# Patient Record
Sex: MALE | Race: WHITE | NOT HISPANIC OR LATINO
[De-identification: names, ages, dates, MRNs, and addresses within clinical notes are randomized per-mention and may not be internally consistent; named-entity substitution may affect disease eponyms.]

---

## 2019-01-07 PROBLEM — Z00.00 ENCOUNTER FOR PREVENTIVE HEALTH EXAMINATION: Status: ACTIVE | Noted: 2019-01-07

## 2019-02-26 ENCOUNTER — RECORD ABSTRACTING (OUTPATIENT)
Age: 69
End: 2019-02-26

## 2019-02-26 ENCOUNTER — RX RENEWAL (OUTPATIENT)
Age: 69
End: 2019-02-26

## 2019-02-26 DIAGNOSIS — E78.5 HYPERLIPIDEMIA, UNSPECIFIED: ICD-10-CM

## 2019-02-26 DIAGNOSIS — Z78.9 OTHER SPECIFIED HEALTH STATUS: ICD-10-CM

## 2019-03-04 ENCOUNTER — RESULT REVIEW (OUTPATIENT)
Age: 69
End: 2019-03-04

## 2019-03-22 ENCOUNTER — LABORATORY RESULT (OUTPATIENT)
Age: 69
End: 2019-03-22

## 2019-03-22 ENCOUNTER — APPOINTMENT (OUTPATIENT)
Dept: ENDOCRINOLOGY | Facility: CLINIC | Age: 69
End: 2019-03-22
Payer: MEDICARE

## 2019-03-22 VITALS
BODY MASS INDEX: 27.04 KG/M2 | WEIGHT: 204 LBS | DIASTOLIC BLOOD PRESSURE: 78 MMHG | HEIGHT: 73 IN | SYSTOLIC BLOOD PRESSURE: 132 MMHG | HEART RATE: 68 BPM

## 2019-03-22 DIAGNOSIS — D64.9 ANEMIA, UNSPECIFIED: ICD-10-CM

## 2019-03-22 PROCEDURE — 36415 COLL VENOUS BLD VENIPUNCTURE: CPT

## 2019-03-22 PROCEDURE — 99213 OFFICE O/P EST LOW 20 MIN: CPT | Mod: 25

## 2019-03-23 LAB
25(OH)D3 SERPL-MCNC: 49.9 NG/ML
ALBUMIN SERPL ELPH-MCNC: 4.1 G/DL
ALP BLD-CCNC: 72 U/L
ALT SERPL-CCNC: 22 U/L
AST SERPL-CCNC: 20 U/L
BASOPHILS # BLD AUTO: 0.04 K/UL
BASOPHILS NFR BLD AUTO: 0.5 %
BILIRUB DIRECT SERPL-MCNC: 0.1 MG/DL
BILIRUB INDIRECT SERPL-MCNC: 0.4 MG/DL
BILIRUB SERPL-MCNC: 0.5 MG/DL
CHOLEST SERPL-MCNC: 112 MG/DL
CHOLEST/HDLC SERPL: 2.8 RATIO
EOSINOPHIL # BLD AUTO: 0.19 K/UL
EOSINOPHIL NFR BLD AUTO: 2.3 %
ESTIMATED AVERAGE GLUCOSE: 97 MG/DL
HBA1C MFR BLD HPLC: 5 %
HCT VFR BLD CALC: 43.6 %
HDLC SERPL-MCNC: 40 MG/DL
HGB BLD-MCNC: 14.5 G/DL
IMM GRANULOCYTES NFR BLD AUTO: 0.4 %
LDLC SERPL CALC-MCNC: 48 MG/DL
LYMPHOCYTES # BLD AUTO: 2.47 K/UL
LYMPHOCYTES NFR BLD AUTO: 29.8 %
MAN DIFF?: NORMAL
MCHC RBC-ENTMCNC: 31.5 PG
MCHC RBC-ENTMCNC: 33.3 GM/DL
MCV RBC AUTO: 94.8 FL
MONOCYTES # BLD AUTO: 0.63 K/UL
MONOCYTES NFR BLD AUTO: 7.6 %
NEUTROPHILS # BLD AUTO: 4.94 K/UL
NEUTROPHILS NFR BLD AUTO: 59.4 %
PLATELET # BLD AUTO: 226 K/UL
PROT SERPL-MCNC: 6.6 G/DL
RBC # BLD: 4.6 M/UL
RBC # FLD: 12.5 %
T3 SERPL-MCNC: 100 NG/DL
T3RU NFR SERPL: 0.9 TBI
T4 SERPL-MCNC: 11.3 UG/DL
TRIGL SERPL-MCNC: 118 MG/DL
TSH SERPL-ACNC: 0.08 UIU/ML
VIT B12 SERPL-MCNC: 384 PG/ML
WBC # FLD AUTO: 8.3 K/UL

## 2019-03-23 NOTE — PHYSICAL EXAM
[Alert] : alert [No Acute Distress] : no acute distress [Well Nourished] : well nourished [Well Developed] : well developed [Healthy Appearance] : healthy appearance [Normal Voice/Communication] : normal voice communication [Normal Sclera/Conjunctiva] : normal sclera/conjunctiva [EOMI] : extra ocular movement intact [No Proptosis] : no proptosis [Normal Outer Ear/Nose] : the ears and nose were normal in appearance [Normal Oropharynx] : the oropharynx was normal [No Neck Mass] : no neck mass was observed [Thyroid Not Enlarged] : the thyroid was not enlarged [No Thyroid Nodules] : there were no palpable thyroid nodules [No Respiratory Distress] : no respiratory distress [Normal Rate] : heart rate was normal  [Normal S1, S2] : normal S1 and S2 [Regular Rhythm] : with a regular rhythm [Pedal Pulses Normal] : the pedal pulses are present [No Edema] : there was no peripheral edema [No Stigmata of Cushings Syndrome] : no stigmata of cushings syndrome [Normal Gait] : normal gait [No Tremors] : no tremors [Oriented x3] : oriented to person, place, and time [Normal Insight/Judgement] : insight and judgment were intact [Normal Affect] : the affect was normal [Normal Mood] : the mood was normal [Acanthosis Nigricans] : no acanthosis nigricans

## 2019-03-23 NOTE — ASSESSMENT
[FreeTextEntry1] : &\par Hypothyroid post surgery/treatment for thyroid cancer.\par Doing well.\par ROV 6 months

## 2019-03-23 NOTE — HISTORY OF PRESENT ILLNESS
[FreeTextEntry1] : March 22, 2019\par \par   PCP: Dr. Job Leon c/o [facility] p [phone]\par             f [phone]\par             (spec: Pulmonary, CCM, Sleep) ~1/2016\par             will see soon, ha flu shot\par            . \par             GI: Now Dr. Thai Ya p [phone] at\par             [address] [facility] [address]\par             Fax: [phone] (also his wife's doctor)\par            .\par             Hematologist: Deuce Darling p  fax [phone]\par             [address] [facility]\par             next ~ 12/2017\par             next July 2018 (q6 mos)\par            .\par \par Has new hematologist.  He believes Dr. Vann is at [facility].\par Dose of thyroxine now 300 mcg plus 200 mcg  but now skips the 300 once a week.\par \par Recent US neck at [facility] not changed.\par \par Plan:  Labs today.\par ROV.  \par             ENT: Dr. Luu ([place]) for nasal papilloma - present for years\par             also saw [facility] but b/o advice they can come\par             back, he decided not to treat. \par             .\par \par \par \par Previous notes from [employer] appended below.\par \par \par   April 20, 2018\par    PCP: Dr. Job Leon c/o [facility] p [phone]\par             f [phone]\par             (spec: Pulmonary, CCM, Sleep) ~1/2016\par             will see soon, ha flu shot\par            . \par             GI: Now Dr. Thai Ya p [phone] at\par             [address] [facility] [address]\par             Fax: [phone] (also his wife's doctor)\par            .\par             Hematologist: Deuce Darling p  fax [phone]\par             [address] [facility]\par             next ~ 12/2017\par             next July 2018 (q6 mos)\par            .\par             ENT: Dr. Jus Wang) for nasal papilloma - present for years\par             also saw [facility] but b/o advice they can come\par             back, he decided not to treat. 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)\par             I-131 Rx\par            .\par            68 yo - feels well, has some fatigue.\par            Taking levothyroxine religiously. \par            .\par            Taking levothyroxine 500 mgc (300 + 200 mcg) daily.\par            Has diarrhea and is on various treatments. \par            Takes low dose OTC Immodium\par            .\par            On antibiotic as he just had a dental implant. \par            Most recent 2018 ultrasound of neck/thyroid bed at [place]:\par            .\par            CLINICAL HISTORY: Thyroid cancer \par             Thyroid Ultrasound \par             Real-time sonographic examination was performed on 4/4/2018 and compared to a previous study dated \par            9/28/2016. The patient is status post total thyroidectomy. A 2.5 x 0.5 x 1.9 cm mass with an \par            echogenic hilum is again noted within the right thyroid bed and likely to represent a lymph node. \par            It is grossly unchanged since the patient's previous study. No mass or residual thyroid tissue is \par            present within the left thyroid bed. \par             IMPRESSION: Status post total thyroidectomy. Stable lymph node within the right thyroid bed. \par            ***Electronically Signed *** \par            ----------------------------------------------- \par            Kahlil Banks MD \par            .\par            .\par            I reviewed with Mr. Fitzgerald the reports from 2015 and 2016\par            .\par            Impression: Very stable. Lymph node neck being monitored.\par            Plan: Next U/S Thyroid bed within next year. \par            TFTs and thyroglobulin today\par            ROV 6 months. \par            .\par            ==\par            .\par            October 25, 2017\par            .\par            PCP: Dr. Job Leon c/o [facility] p [phone]\par             f [phone]\par             (spec: Pulmonary, CCM, Sleep) ~1/2016\par             will see soon, ha flu shot\par            . \par             GI: Now Dr. Thai Ya p [phone] at\par             [address] [facility] [address]\par             Fax: [phone]\par            .\par             Hematologist: Deuce Darling p  fax [phone]\par             [address] [facility]\par             next ~ 12/2017\par            .\par             ENT: Dr. Luu ([place]) for nasal papilloma\par             also saw [facility] but b/o advice they can come\par             back, he decided not to treat. 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)\par             I-131 Rx\par             .\par             (Crohn's disease)\par             Vitamin D deficiency\par             Low platelets & platelet clumping\par             Preclinical "CLL" (2015: 3 % atypical lymphocytes)\par            .\par            -1992 - [facility] - Left thyroid lobectomy - multifocal papillary thyroid cancer: "Mass in the left lobe of the thyroid - 2 cm firm well circumscribed nodule within a 3.3 cm fleshy maroon nodule. The dominant tumor mass microscopically a papillary carcinoma. Within the tumor itself, vascular invasion identified. Along the perimeter of the main tumor, multiple microscopic satellite nodules of papillary carcinoma, several of the foci closely approached the external thyroid capsule.....in addition to the main tumor, there were at least three separate nodules in the left lobe ranging in size from 0.1 to 1.0 cm and there was tumor invoving the right lobe. At least ten separate foci fo papillary carcinoma up to 0.4 cm...close to a total thyroidectomy ..." as noted in [facility] pathology report. \par            .\par            Most recent studies:\par            6/2015 - Sonogram - [place] - several benign appearing lymph nodes in both carotid chains that are well circumscribed with echogenic jordy, larges 2.7 cm...(Dr. William Rivera) \par             6/2015 - Bone density: Spine +2.3, L hip T +0.8, forearm +1.7\par             L femoral neck T -0.4 \par            .\par            Currently taking levothyroxine 500: 300 plus 200 mcg daily. \par            Taking vitamin D 50,000 weekly, but he forgets and probably takes\par            about 3 pills a month.\par            . \par            Still has diarrhea; however, he believes this is related to resection of intestine rather than any flare. 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 TSH has been kept low and bone density is in good range. Serial ultrasound and thyroglobulin levels have been very reassuring. He has also required vitamin D for previously low levels, and has done well on supplementation. \par            .\par            Plan: Updated lab tests today. Results will be mailed to Mr. Fitzgerald and faxed to the other members of his healthcare team. \par            . \par            Return in about six months and follow up ultrasound of neck before that.

## 2019-03-25 LAB — THYROGLOB AB SERPL IA-ACNC: <1.8 IU/ML

## 2019-08-25 ENCOUNTER — RX RENEWAL (OUTPATIENT)
Age: 69
End: 2019-08-25

## 2019-10-14 ENCOUNTER — LABORATORY RESULT (OUTPATIENT)
Age: 69
End: 2019-10-14

## 2019-10-14 ENCOUNTER — APPOINTMENT (OUTPATIENT)
Dept: ENDOCRINOLOGY | Facility: CLINIC | Age: 69
End: 2019-10-14
Payer: MEDICARE

## 2019-10-14 VITALS
SYSTOLIC BLOOD PRESSURE: 120 MMHG | HEART RATE: 72 BPM | HEIGHT: 73 IN | WEIGHT: 206 LBS | DIASTOLIC BLOOD PRESSURE: 70 MMHG | BODY MASS INDEX: 27.3 KG/M2

## 2019-10-14 PROCEDURE — 36415 COLL VENOUS BLD VENIPUNCTURE: CPT

## 2019-10-14 PROCEDURE — 99214 OFFICE O/P EST MOD 30 MIN: CPT | Mod: 25

## 2019-10-14 NOTE — HISTORY OF PRESENT ILLNESS
[FreeTextEntry1] : Oct 14, 2019\par \par  PCP: Dr. Job Leon c/o [facility] p [phone]\par             f [phone]\par             (spec: Pulmonary, CCM, Sleep) ~1/2016\par             will see soon, ha flu shot\par            . \par             GI: Now Dr. Thai Ya p [phone] at\par             [address] [facility] [address]\par             Fax: [phone] (also his wife's doctor)\par            .\par             Hematologist: Deuce Darling p  fax [phone]\par             [address] [facility]\par \par At last visit, TSH 0.08 T3 100, T4 11.3 while taking average daily dose of 457 mcg of levothyroxine (highest he ever required was\par ~ 600 mcg/d b/o Crohns and shorter bowel).  The 457 was generated by taking 200 mcg x 7 days and 300 mcg x 6 days,\par so we will consider 200 + 200 + 25 mcg \par \par \par Plans:   TFTs today and\par He is concerned about his vegan wife's iodine intake.  \par Labs today and again before next visit (at [place])\par Ultrasound neck before next visit as well.\par He is comfortable with suppressed TSH and his bone density is far above average.  \par             \par \par \par March 22, 2019\par \par   PCP: Dr. Job Leon c/o [facility] p [phone]\par             f [phone]\par             (spec: Pulmonary, CCM, Sleep) ~1/2016\par             will see soon, ha flu shot\par            . \par             GI: Now Dr. Thai Ya p [phone] at\par             [address] [facility] [address]\par             Fax: [phone] (also his wife's doctor)\par            .\par             Hematologist: Deuce Darling p  fax [phone]\par             [address] [facility]\par             next ~ 12/2017\par             next July 2018 (q6 mos)\par            .\par \par Has new hematologist.  He believes Dr. Vann is at [facility].\par Dose of thyroxine now 300 mcg plus 200 mcg  but now skips the 300 once a week.\par \par Recent US neck at [place] not changed.\par \par Plan:  Labs today.\par ROV.  \par             ENT: Dr. Luu ([place]) for nasal papilloma - present for years\par             also saw [facility] but b/o advice they can come\par             back, he decided not to treat. \par             .\par \par \par \par Previous notes from [employer] appended below.\par \par \par   April 20, 2018\par    PCP: Dr. Job Leon c/o [facility] p [phone]\par             f [phone]\par             (spec: Pulmonary, CCM, Sleep) ~1/2016\par             will see soon, ha flu shot\par            . \par             GI: Now Dr. Thai Ya p [phone] at\par             [address] [facility] [address]\par             Fax: [phone] (also his wife's doctor)\par            .\par             Hematologist: Deuce Darling p  fax [phone]\par             [address] [facility]\par             next ~ 12/2017\par             next July 2018 (q6 mos)\par            .\par             ENT: Dr. Luu ([place]) for nasal papilloma - present for years\par             also saw [facility] but b/o advice they can come\par             back, he decided not to treat. 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)\par             I-131 Rx\par            .\par            68 yo - feels well, has some fatigue.\par            Taking levothyroxine religiously. \par            .\par            Taking levothyroxine 500 mgc (300 + 200 mcg) daily.\par            Has diarrhea and is on various treatments. \par            Takes low dose OTC Immodium\par            .\par            On antibiotic as he just had a dental implant. \par            Most recent 2018 ultrasound of neck/thyroid bed at [place]:\par            .\par            CLINICAL HISTORY: Thyroid cancer \par             Thyroid Ultrasound \par             Real-time sonographic examination was performed on 4/4/2018 and compared to a previous study dated \par            9/28/2016. The patient is status post total thyroidectomy. A 2.5 x 0.5 x 1.9 cm mass with an \par            echogenic hilum is again noted within the right thyroid bed and likely to represent a lymph node. \par            It is grossly unchanged since the patient's previous study. No mass or residual thyroid tissue is \par            present within the left thyroid bed. \par             IMPRESSION: Status post total thyroidectomy. Stable lymph node within the right thyroid bed. \par            ***Electronically Signed *** \par            ----------------------------------------------- \par            Kahlil Banks MD \par            .\par            .\par            I reviewed with Mr. Fitzgerald the reports from 2015 and 2016\par            .\par            Impression: Very stable. Lymph node neck being monitored.\par            Plan: Next U/S Thyroid bed within next year. \par            TFTs and thyroglobulin today\par            ROV 6 months. \par            .\par            ==\par            .\par            October 25, 2017\par            .\par            PCP: Dr. Job Leon c/o [facility] p [phone]\par             f [phone]\par             (spec: Pulmonary, CCM, Sleep) ~1/2016\par             will see soon, ha flu shot\par            . \par             GI: Now Dr. Thai Ya p [phone] at\par             [address] [facility] [address]\par             Fax: [phone]\par            .\par             Hematologist: Deuce Darling p  fax [phone]\par             [address] [facility]\par             next ~ 12/2017\par            .\par             ENT: Dr. Luu ([place]) for nasal papilloma\par             also saw [facility] but b/o advice they can come\par             back, he decided not to treat. 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)\par             I-131 Rx\par             .\par             (Crohn's disease)\par             Vitamin D deficiency\par             Low platelets & platelet clumping\par             Preclinical "CLL" (2015: 3 % atypical lymphocytes)\par            .\par            -1992 - [facility] - Left thyroid lobectomy - multifocal papillary thyroid cancer: "Mass in the left lobe of the thyroid - 2 cm firm well circumscribed nodule within a 3.3 cm fleshy maroon nodule. The dominant tumor mass microscopically a papillary carcinoma. Within the tumor itself, vascular invasion identified. Along the perimeter of the main tumor, multiple microscopic satellite nodules of papillary carcinoma, several of the foci closely approached the external thyroid capsule.....in addition to the main tumor, there were at least three separate nodules in the left lobe ranging in size from 0.1 to 1.0 cm and there was tumor invoving the right lobe. At least ten separate foci fo papillary carcinoma up to 0.4 cm...close to a total thyroidectomy ..." as noted in [facility] pathology report. \par            .\par            Most recent studies:\par            6/2015 - Sonogram - [place] - several benign appearing lymph nodes in both carotid chains that are well circumscribed with echogenic jordy, larges 2.7 cm...(Dr. William Rivera) \par             6/2015 - Bone density: Spine +2.3, L hip T +0.8, forearm +1.7\par             L femoral neck T -0.4 \par            .\par            Currently taking levothyroxine 500: 300 plus 200 mcg daily. \par            Taking vitamin D 50,000 weekly, but he forgets and probably takes\par            about 3 pills a month.\par            . \par            Still has diarrhea; however, he believes this is related to resection of intestine rather than any flare. 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 TSH has been kept low and bone density is in good range. Serial ultrasound and thyroglobulin levels have been very reassuring. He has also required vitamin D for previously low levels, and has done well on supplementation. \par            .\par            Plan: Updated lab tests today. Results will be mailed to Vinicius Lia and faxed to the other members of his healthcare team. \par            . \par            Return in about six months and follow up ultrasound of neck before that.

## 2019-10-14 NOTE — PHYSICAL EXAM
[Alert] : alert [No Acute Distress] : no acute distress [Well Nourished] : well nourished [Well Developed] : well developed [Healthy Appearance] : healthy appearance [Normal Voice/Communication] : normal voice communication [No Proptosis] : no proptosis [Normal Outer Ear/Nose] : the ears and nose were normal in appearance [No Neck Mass] : no neck mass was observed [Thyroid Not Enlarged] : the thyroid was not enlarged [No Thyroid Nodules] : there were no palpable thyroid nodules [No Respiratory Distress] : no respiratory distress [Normal Rate and Effort] : normal respiratory rhythm and effort [No Accessory Muscle Use] : no accessory muscle use [Normal Rate] : heart rate was normal  [Regular Rhythm] : with a regular rhythm [Murmurs] : no murmurs [No Edema] : there was no peripheral edema [No Stigmata of Cushings Syndrome] : no stigmata of cushings syndrome [No Tremors] : no tremors [Oriented x3] : oriented to person, place, and time [Normal Insight/Judgement] : insight and judgment were intact [Normal Affect] : the affect was normal [Normal Mood] : the mood was normal

## 2019-10-19 LAB
ALBUMIN SERPL ELPH-MCNC: 4 G/DL
ALP BLD-CCNC: 68 U/L
ALT SERPL-CCNC: 22 U/L
AST SERPL-CCNC: 20 U/L
BASOPHILS # BLD AUTO: 0.05 K/UL
BASOPHILS NFR BLD AUTO: 0.6 %
BILIRUB DIRECT SERPL-MCNC: 0.1 MG/DL
BILIRUB INDIRECT SERPL-MCNC: 0.2 MG/DL
BILIRUB SERPL-MCNC: 0.2 MG/DL
EOSINOPHIL # BLD AUTO: 0.23 K/UL
EOSINOPHIL NFR BLD AUTO: 2.6 %
HCT VFR BLD CALC: 44.2 %
HGB BLD-MCNC: 14.3 G/DL
IMM GRANULOCYTES NFR BLD AUTO: 0.3 %
LYMPHOCYTES # BLD AUTO: 2.87 K/UL
LYMPHOCYTES NFR BLD AUTO: 33 %
MAN DIFF?: NORMAL
MCHC RBC-ENTMCNC: 31.2 PG
MCHC RBC-ENTMCNC: 32.4 GM/DL
MCV RBC AUTO: 96.5 FL
MONOCYTES # BLD AUTO: 0.92 K/UL
MONOCYTES NFR BLD AUTO: 10.6 %
NEUTROPHILS # BLD AUTO: 4.61 K/UL
NEUTROPHILS NFR BLD AUTO: 52.9 %
PLATELET # BLD AUTO: 209 K/UL
PROT SERPL-MCNC: 6.3 G/DL
PSA SERPL-MCNC: 1.13 NG/ML
RBC # BLD: 4.58 M/UL
RBC # FLD: 12.5 %
T3 SERPL-MCNC: 102 NG/DL
T3RU NFR SERPL: 0.9 TBI
T4 FREE SERPL-MCNC: 2.1 NG/DL
T4 SERPL-MCNC: 11.5 UG/DL
THYROGLOB AB SERPL IA-ACNC: <1.8 IU/ML
TSH SERPL-ACNC: 0.03 UIU/ML
VIT B6 SERPL-MCNC: 9.4 UG/L
WBC # FLD AUTO: 8.71 K/UL

## 2020-03-04 ENCOUNTER — RX RENEWAL (OUTPATIENT)
Age: 70
End: 2020-03-04

## 2020-03-23 ENCOUNTER — APPOINTMENT (OUTPATIENT)
Dept: ENDOCRINOLOGY | Facility: CLINIC | Age: 70
End: 2020-03-23

## 2020-05-17 ENCOUNTER — RX RENEWAL (OUTPATIENT)
Age: 70
End: 2020-05-17

## 2020-10-26 ENCOUNTER — RESULT REVIEW (OUTPATIENT)
Age: 70
End: 2020-10-26

## 2020-11-06 ENCOUNTER — LABORATORY RESULT (OUTPATIENT)
Age: 70
End: 2020-11-06

## 2020-11-11 ENCOUNTER — APPOINTMENT (OUTPATIENT)
Dept: ENDOCRINOLOGY | Facility: CLINIC | Age: 70
End: 2020-11-11
Payer: MEDICARE

## 2020-11-11 VITALS
SYSTOLIC BLOOD PRESSURE: 122 MMHG | WEIGHT: 200 LBS | DIASTOLIC BLOOD PRESSURE: 80 MMHG | BODY MASS INDEX: 26.51 KG/M2 | HEART RATE: 84 BPM | HEIGHT: 73 IN | OXYGEN SATURATION: 98 %

## 2020-11-11 DIAGNOSIS — E53.1 PYRIDOXINE DEFICIENCY: ICD-10-CM

## 2020-11-11 DIAGNOSIS — M83.9 ADULT OSTEOMALACIA, UNSPECIFIED: ICD-10-CM

## 2020-11-11 DIAGNOSIS — R73.03 PREDIABETES.: ICD-10-CM

## 2020-11-11 LAB
25(OH)D3 SERPL-MCNC: 42.4 NG/ML
ALBUMIN SERPL ELPH-MCNC: 4 G/DL
ALP BLD-CCNC: 71 U/L
ALT SERPL-CCNC: 16 U/L
ANION GAP SERPL CALC-SCNC: 10 MMOL/L
AST SERPL-CCNC: 15 U/L
BASOPHILS # BLD AUTO: 0.05 K/UL
BASOPHILS NFR BLD AUTO: 0.7 %
BILIRUB DIRECT SERPL-MCNC: 0.1 MG/DL
BILIRUB INDIRECT SERPL-MCNC: 0.3 MG/DL
BILIRUB SERPL-MCNC: 0.4 MG/DL
BUN SERPL-MCNC: 15 MG/DL
CALCIUM SERPL-MCNC: 9.1 MG/DL
CHLORIDE SERPL-SCNC: 106 MMOL/L
CHOLEST SERPL-MCNC: 100 MG/DL
CO2 SERPL-SCNC: 28 MMOL/L
CREAT SERPL-MCNC: 1.02 MG/DL
EOSINOPHIL # BLD AUTO: 0.23 K/UL
EOSINOPHIL NFR BLD AUTO: 3 %
ESTIMATED AVERAGE GLUCOSE: 103 MG/DL
FERRITIN SERPL-MCNC: 167 NG/ML
FOLATE SERPL-MCNC: 11 NG/ML
GLUCOSE SERPL-MCNC: 90 MG/DL
HBA1C MFR BLD HPLC: 5.2 %
HCT VFR BLD CALC: 44.1 %
HDLC SERPL-MCNC: 40 MG/DL
HGB BLD-MCNC: 14.2 G/DL
IMM GRANULOCYTES NFR BLD AUTO: 0.4 %
IRON SATN MFR SERPL: 38 %
IRON SERPL-MCNC: 88 UG/DL
LDLC SERPL CALC-MCNC: 41 MG/DL
LYMPHOCYTES # BLD AUTO: 2.87 K/UL
LYMPHOCYTES NFR BLD AUTO: 37.7 %
MAN DIFF?: NORMAL
MCHC RBC-ENTMCNC: 31.1 PG
MCHC RBC-ENTMCNC: 32.2 GM/DL
MCV RBC AUTO: 96.5 FL
MONOCYTES # BLD AUTO: 0.55 K/UL
MONOCYTES NFR BLD AUTO: 7.2 %
NEUTROPHILS # BLD AUTO: 3.88 K/UL
NEUTROPHILS NFR BLD AUTO: 51 %
NONHDLC SERPL-MCNC: 60 MG/DL
PLATELET # BLD AUTO: 206 K/UL
POTASSIUM SERPL-SCNC: 4.5 MMOL/L
PROT SERPL-MCNC: 6.2 G/DL
PSA SERPL-MCNC: 1.19 NG/ML
RBC # BLD: 4.57 M/UL
RBC # FLD: 12.8 %
SODIUM SERPL-SCNC: 143 MMOL/L
T3 SERPL-MCNC: 84 NG/DL
T4 SERPL-MCNC: 10.3 UG/DL
THYROGLOB AB SERPL IA-ACNC: <1.8 IU/ML
TIBC SERPL-MCNC: 231 UG/DL
TRIGL SERPL-MCNC: 96 MG/DL
TSH SERPL-ACNC: 0.01 UIU/ML
UIBC SERPL-MCNC: 143 UG/DL
VIT B12 SERPL-MCNC: 404 PG/ML
WBC # FLD AUTO: 7.61 K/UL

## 2020-11-11 PROCEDURE — 99214 OFFICE O/P EST MOD 30 MIN: CPT

## 2020-11-13 PROBLEM — E53.1 VITAMIN B6 DEFICIENCY SYNDROME: Status: RESOLVED | Noted: 2019-10-14 | Resolved: 2020-11-13

## 2020-11-13 PROBLEM — M83.9 VITAMIN D DEFICIENT OSTEOMALACIA: Status: ACTIVE | Noted: 2019-02-26

## 2020-11-13 PROBLEM — R73.03 PREDIABETES: Status: RESOLVED | Noted: 2019-03-22 | Resolved: 2020-11-13

## 2020-11-13 NOTE — HISTORY OF PRESENT ILLNESS
[FreeTextEntry1] : Nov 11, 2020     \par \par PCP: Dr. Job Leon c/o [facility] p [phone]   f [phone]\par             (spec: Pulmonary, CCM, Sleep) ~1/2016\par             will see soon, ha flu shot\par            . \par             GI: Now Dr. 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 ([facility])\par             I-131 Rx\par \par US thyroid AOK\par thyroblobulin undetectable.\par TSH suppressed.\par Possibly has a short fuse or just stress of \par \par Taking LT4 300 plus 200 x 6 days and 200 x 1 day.\par So can decrease the 200 one day to 100 that day.\par \par Examination: \par Constitutional:  Alert, well nourished, healthy appearance, no acute distress \par Eyes:  No proptosis, no lid lag\par Thyroid:\par Pulmonary:  No respiratory distress, no accessory muscle use; normal rate and effort\par Cardiac:  Normal rate\par Vascular: \par Endocrine:  No stigmata of Cushing’s Syndrome\par Musculoskeletal:  Normal gait, no involuntary movements\par Neurology:  No tremors\par Affect/Mood/Psych:  Oriented x 3; normal affect, normal insight/judgement, normal mood \par .\par  Impression:  Still follows with Hematology.    \par TSH of 0.01 may be lower than necessary even though bone density in 2015 was very good\par     at [place] with T scroes LS + 2.3,  TH + 0.8       forearm T + 1.7\par Thyroglobulin and thyroglobulin antibody remains negative.\par \par Plan:  the one day a week where he takes only 200 mcg, he can decrease to 100 mcg.    \par \par \par \par \par \par Oct 14, 2019\par \par  PCP: Dr. Job Leon c/o [facility] p [phone]\par             f [phone]\par             (spec: Pulmonary, CCM, Sleep) ~1/2016\par             will see soon, ha flu shot\par            . \par             GI: Now Dr. Thai Ya p [phone] at\par             [address] [facility] [address]\par             Fax: [phone] (also his wife's doctor)\par            .\par             Hematologist: Deuce Darling p  fax [phone]\par             [address] [facility]\par \par At last visit, TSH 0.08 T3 100, T4 11.3 while taking average daily dose of 457 mcg of levothyroxine (highest he ever required was\par ~ 600 mcg/d b/o Crohns and shorter bowel).  The 457 was generated by taking 200 mcg x 7 days and 300 mcg x 6 days,\par so we will consider 200 + 200 + 25 mcg \par \par \par Plans:   TFTs today and\par He is concerned about his vegan wife's iodine intake.  \par Labs today and again before next visit (at [facility])\par Ultrasound neck before next visit as well.\par He is comfortable with suppressed TSH and his bone density is far above average.  \par             \par \par \par March 22, 2019\par \par   PCP: Dr. Job Leon c/o [facility] p [phone]\par             f [phone]\par             (spec: Pulmonary, CCM, Sleep) ~1/2016\par             will see soon, ha flu shot\par            . \par             GI: Now Dr. Thai Ya p [phone] at\par             [address] [facility] [address]\par             Fax: [phone] (also his wife's doctor)\par            .\par             Hematologist: Deuce Darling p  fax [phone]\par             [address] [facility]\par             next ~ 12/2017\par             next July 2018 (q6 mos)\par            .\par \par Has new hematologist.  He believes Dr. Vann is at [facility].\par Dose of thyroxine now 300 mcg plus 200 mcg  but now skips the 300 once a week.\par \par Recent US neck at [facility] not changed.\par \par Plan:  Labs today.\par ROV.  \par             ENT: Dr. Luu ([place]) for nasal papilloma - present for years\par             also saw ENT [facility] but b/o advice they can come\par             back, he decided not to treat. \par             .\par \par \par \par Previous notes from [employer] appended below.\par \par \par   April 20, 2018\par    PCP: Dr. Job Leon c/o [facility] p [phone]\par             f [phone]\par             (spec: Pulmonary, CCM, Sleep) ~1/2016\par             will see soon, ha flu shot\par            . \par             GI: Now Dr. Thai Ya p [phone] at\par             [address] [facility] [address]\par             Fax: [phone] (also his wife's doctor)\par            .\par             Hematologist: Deuce Darling p  fax [phone]\par             [address] [facility]\par             next ~ 12/2017\par             next July 2018 (q6 mos)\par            .\par             ENT: Dr. Luu ([place]) for nasal papilloma - present for years\par             also saw ENT [facility] but b/o advice they can come\par             back, he decided not to treat. 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 \par            .\par            Taking levothyroxine 500 mgc (300 + 200 mcg) daily.\par            Has diarrhea and is on various treatments. \par            Takes low dose OTC Immodium\par            .\par            On antibiotic as he just had a dental implant. \par            Most recent 2018 ultrasound of neck/thyroid bed at [facility]:\par            .\par            CLINICAL HISTORY: Thyroid cancer \par             Thyroid Ultrasound \par             Real-time sonographic examination was performed on 4/4/2018 and compared to a previous study dated \par            9/28/2016. The patient is status post total thyroidectomy. A 2.5 x 0.5 x 1.9 cm mass with an \par            echogenic hilum is again noted within the right thyroid bed and likely to represent a lymph node. \par            It is grossly unchanged since the patient's previous study. No mass or residual thyroid tissue is \par            present within the left thyroid bed. \par             IMPRESSION: Status post total thyroidectomy. Stable lymph node within the right thyroid bed. \par            ***Electronically Signed *** \par            ----------------------------------------------- \par            Kahlil Banks MD \par            .\par            .\par            I reviewed with Mr. Fitzgerald the reports from 2015 and 2016\par            .\par            Impression: Very stable. Lymph node neck being monitored.\par            Plan: Next U/S Thyroid bed within next year. \par            TFTs and thyroglobulin today\par            ROV 6 months. \par            .\par            ==\par            .\par            October 25, 2017\par            .\par            PCP: Dr. Job Leon c/o [facility] p [phone]\par             f [phone]\par             (spec: Pulmonary, CCM, Sleep) ~1/2016\par             will see soon, ha flu shot\par            . \par             GI: Now Dr. Thai Ya p [phone] at\par             [address] [facility] [address]\par             Fax: [phone]\par            .\par             Hematologist: Deuce Darling p  fax [phone]\par             [address] [facility]\par             next ~ 12/2017\par            .\par             ENT: Dr. Luu ([place]) for nasal papilloma\par             also saw ENT [facility] but b/o advice they can come\par             back, he decided not to treat. 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 - Left thyroid lobectomy - multifocal papillary thyroid cancer: "Mass in the left lobe of the thyroid - 2 cm firm well circumscribed nodule within a 3.3 cm fleshy maroon nodule. The dominant tumor mass microscopically a papillary carcinoma. Within the tumor itself, vascular invasion identified. Along the perimeter of the main tumor, multiple microscopic satellite nodules of papillary carcinoma, several of the foci closely approached the external thyroid capsule.....in addition to the main tumor, there were at least three separate nodules in the left lobe ranging in size from 0.1 to 1.0 cm and there was tumor invoving the right lobe. At least ten separate foci fo papillary carcinoma up to 0.4 cm...close to a total thyroidectomy ..." as noted in [facility] pathology report. \par            .\par            Most recent studies:\par            6/2015 - Sonogram - [facility] - several benign appearing lymph nodes in both carotid chains that are well circumscribed with echogenic jordy, larges 2.7 cm...(Dr. William Rivera) \par             6/2015 - Bone density: Spine +2.3, L hip T +0.8, forearm +1.7\par             L femoral neck T -0.4 \par            .\par            Currently taking levothyroxine 500: 300 plus 200 mcg daily. \par            Taking vitamin D 50,000 weekly, but he forgets and probably takes\par            about 3 pills a month.\par            . \par            Still has diarrhea; however, he believes this is related to resection of intestine rather than any flare. 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 TSH has been kept low and bone density is in good range. Serial ultrasound and thyroglobulin levels have been very reassuring. He has also required vitamin D for previously low levels, and has done well on supplementation. \par            .\par            Plan: Updated lab tests today. Results will be mailed to  Lia and faxed to the other members of his healthcare team. \par            . \par            Return in about six months and follow up ultrasound of neck before that.

## 2021-05-05 ENCOUNTER — LABORATORY RESULT (OUTPATIENT)
Age: 71
End: 2021-05-05

## 2021-05-12 ENCOUNTER — APPOINTMENT (OUTPATIENT)
Dept: ENDOCRINOLOGY | Facility: CLINIC | Age: 71
End: 2021-05-12
Payer: MEDICARE

## 2021-05-12 ENCOUNTER — NON-APPOINTMENT (OUTPATIENT)
Age: 71
End: 2021-05-12

## 2021-05-12 VITALS
SYSTOLIC BLOOD PRESSURE: 135 MMHG | BODY MASS INDEX: 26.77 KG/M2 | HEIGHT: 73 IN | OXYGEN SATURATION: 97 % | WEIGHT: 202 LBS | HEART RATE: 69 BPM | DIASTOLIC BLOOD PRESSURE: 80 MMHG

## 2021-05-12 PROCEDURE — 99214 OFFICE O/P EST MOD 30 MIN: CPT

## 2021-05-12 NOTE — HISTORY OF PRESENT ILLNESS
[FreeTextEntry1] : May 12, 2021    in person\par \par PCP: Dr. Job Leon c/o [facility] p [phone]   f [phone]\par             (spec: Pulmonary, CCM, Sleep) ~1/2016\par             will see soon, ha flu shot\par            . \par             GI: Now Dr. Thai Ya p [phone] at   [address] [facility] [address]    Fax: [phone] (also his wife's doctor)\par            .\par             Hematologist: Deuce Darling p  fax [phone]->   Dr. 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 ([facility])\par             I-131 Rx\par \par \par Recent labs at [place] on May 5 included \par WBC   6.87\par Hct 41.4 \par PSA 1.27\par B12 437    on monthly injection \par TSH 0.02\par T4 10.8  on levothyroxine 300 + 200 mcg daily:  Sun 200, Mon 500, T 400, Wed 500, Th 500, F 500 S 500 \par                           will lower by another 100:  Th will be 400 instead of 500\par 25 OH vit D 37    on 50,000 iu BIW \par LDL 46\par HDL 40 \par glucose 82\par calcium 9.0\par creatinine 1.05\par thyroglobulin tumor marker \par \par Imp:  TSH suppressed, will lower dose of thyroxine by another 100 mcg/week.\par Updated labs before visit in six months and US neck.  \par \par \par \par \par Nov 11, 2020     \par \par PCP: Dr. Job Leon c/o [facility] p [phone]   f [phone]\par             (spec: Pulmonary, CCM, Sleep) ~1/2016\par             will see soon, ha flu shot\par            . \par             GI: Now Dr. 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 ([facility])\par             I-131 Rx\par \par US thyroid AOK\par thyroblobulin undetectable.\par TSH suppressed.\par Possibly has a short fuse or just stress of \par \par Taking LT4 300 plus 200 x 6 days and 200 x 1 day.\par So can decrease the 200 one day to 100 that day.\par \par Examination: \par Constitutional:  Alert, well nourished, healthy appearance, no acute distress \par Eyes:  No proptosis, no lid lag\par Thyroid:\par Pulmonary:  No respiratory distress, no accessory muscle use; normal rate and effort\par Cardiac:  Normal rate\par Vascular: \par Endocrine:  No stigmata of Cushing’s Syndrome\par Musculoskeletal:  Normal gait, no involuntary movements\par Neurology:  No tremors\par Affect/Mood/Psych:  Oriented x 3; normal affect, normal insight/judgement, normal mood \par .\par  Impression:  Still follows with Hematology.    \par TSH of 0.01 may be lower than necessary even though bone density in 2015 was very good\par     at [place] with T scroes LS + 2.3,  TH + 0.8       forearm T + 1.7\par Thyroglobulin and thyroglobulin antibody remains negative.\par \par Plan:  the one day a week where he takes only 200 mcg, he can decrease to 100 mcg.    \par \par \par \par \par \par Oct 14, 2019\par \par  PCP: Dr. Job Leon c/o [facility] p [phone]\par             f [phone]\par             (spec: Pulmonary, CCM, Sleep) ~1/2016\par             will see soon, ha flu shot\par            . \par             GI: Now Dr. Thai Ya p [phone] at\par             [address] [facility] [address]\par             Fax: [phone] (also his wife's doctor)\par            .\par             Hematologist: Deuce Darling p  fax [phone]\par             [address] [facility]\par \par At last visit, TSH 0.08 T3 100, T4 11.3 while taking average daily dose of 457 mcg of levothyroxine (highest he ever required was\par ~ 600 mcg/d b/o Crohns and shorter bowel).  The 457 was generated by taking 200 mcg x 7 days and 300 mcg x 6 days,\par so we will consider 200 + 200 + 25 mcg \par \par \par Plans:   TFTs today and\par He is concerned about his vegan wife's iodine intake.  \par Labs today and again before next visit (at [place])\par Ultrasound neck before next visit as well.\par He is comfortable with suppressed TSH and his bone density is far above average.  \par             \par \par \par March 22, 2019\par \par   PCP: Dr. Job Leon c/o [facility] p [phone]\par             f [phone]\par             (spec: Pulmonary, CCM, Sleep) ~1/2016\par             will see soon, ha flu shot\par            . \par             GI: Now Dr. Thai Ya p [phone] at\par             [address] [facility] [address]\par             Fax: [phone] (also his wife's doctor)\par            .\par             Hematologist: Deuce Darling p  fax [phone]\par             [address] [facility]\par             next ~ 12/2017\par             next July 2018 (q6 mos)\par            .\par \par Has new hematologist.  He believes Dr. Vann is at [facility].\par Dose of thyroxine now 300 mcg plus 200 mcg  but now skips the 300 once a week.\par \par Recent US neck at [place] not changed.\par \par Plan:  Labs today.\par ROV.  \par             ENT: Dr. Luu ([place]) for nasal papilloma - present for years\par             also saw ENT [facility] but b/o advice they can come\par             back, he decided not to treat. \par             .\par \par \par \par Previous notes from [employer] appended below.\par \par \par   April 20, 2018\par    PCP: Dr. Job Leon c/o [facility] p [phone]\par             f [phone]\par             (spec: Pulmonary, CCM, Sleep) ~1/2016\par             will see soon, ha flu shot\par            . \par             GI: Now Dr. Thai Ya p [phone] at\par             [address] [facility] [address]\par             Fax: [phone] (also his wife's doctor)\par            .\par             Hematologist: Deuce Darling p  fax [phone]\par             [address] [place], [place]. \[facility].\""-P\par             next ~ 12/2017\par             next July 2018 (q6 mos)\par            .\par             ENT: Dr. Luu ([place]) for nasal papilloma - present for years\par             also saw ENT [facility] but b/o advice they can come\par             back, he decided not to treat. 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 \par            .\par            Taking levothyroxine 500 mgc (300 + 200 mcg) daily.\par            Has diarrhea and is on various treatments. \par            Takes low dose OTC Immodium\par            .\par            On antibiotic as he just had a dental implant. \par            Most recent 2018 ultrasound of neck/thyroid bed at [place]:\par            .\par            CLINICAL HISTORY: Thyroid cancer \par             Thyroid Ultrasound \par             Real-time sonographic examination was performed on 4/4/2018 and compared to a previous study dated \par            9/28/2016. The patient is status post total thyroidectomy. A 2.5 x 0.5 x 1.9 cm mass with an \par            echogenic hilum is again noted within the right thyroid bed and likely to represent a lymph node. \par            It is grossly unchanged since the patient's previous study. No mass or residual thyroid tissue is \par            present within the left thyroid bed. \par             IMPRESSION: Status post total thyroidectomy. Stable lymph node within the right thyroid bed. \par            ***Electronically Signed *** \par            ----------------------------------------------- \par            Kahlil Banks MD \par            .\par            .\par            I reviewed with Mr. Fitzgerald the reports from 2015 and 2016\par            .\par            Impression: Very stable. Lymph node neck being monitored.\par            Plan: Next U/S Thyroid bed within next year. \par            TFTs and thyroglobulin today\par            ROV 6 months. \par            .\par            ==\par            .\par            October 25, 2017\par            .\par            PC[facility]: [facility] p [phone]\par             f [phone]\par             (spec: Pulmonary, CCM, Sleep) ~1/2016\par             will see soon, ha flu shot\par            . \par             GI: Now Dr. Thai Ya p [phone] at\par             [address] [facility] [address]\par             Fax: [phone]\par            .\par             Hematologist: Deuce Darling p  fax [phone]\par             [address] [facility]\par             next ~ 12/2017\par            .\par             ENT: Dr. Luu ([place]) for nasal papilloma\par             also saw ENT [facility] but b/o advice they can come\par             back, he decided not to treat. 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 The dominant tumor mass microscopically a papillary carcinoma. Within the tumor itself, vascular invasion identified. Along the perimeter of the main tumor, multiple microscopic satellite nodules of papillary carcinoma, several of the foci closely approached the external thyroid capsule.....in addition to the main tumor, there were at least three separate nodules in the left lobe ranging in size from 0.1 to 1.0 cm and there was tumor invoving the right lobe. At least ten separate foci fo papillary carcinoma up to 0.4 cm...close to a total thyroidectomy ..." as noted in [facility] pathology report. \par            .\par            Most recent studies:\par            6/2015 - Sonogram - [facility] - several benign appearing lymph nodes in both carotid chains that are well circumscribed with echogenic jordy, larges 2.7 cm...(Dr. William Rivera) \par             6/2015 - Bone density: Spine +2.3, L hip T +0.8, forearm +1.7\par             L femoral neck T -0.4 \par            .\par            Currently taking levothyroxine 500: 300 plus 200 mcg daily. \par            Taking vitamin D 50,000 weekly, but he forgets and probably takes\par            about 3 pills a month.\par            . \par            Still has diarrhea; however, he believes this is related to resection of intestine rather than any flare. 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 TSH has been kept low and bone density is in good range. Serial ultrasound and thyroglobulin levels have been very reassuring. He has also required vitamin D for previously low levels, and has done well on supplementation. \par            .\par            Plan: Updated lab tests today. Results will be mailed to  Lia and faxed to the other members of his healthcare team. \par            . \par            Return in about six months and follow up ultrasound of neck before that.

## 2021-07-28 ENCOUNTER — NON-APPOINTMENT (OUTPATIENT)
Age: 71
End: 2021-07-28

## 2021-07-28 ENCOUNTER — APPOINTMENT (OUTPATIENT)
Dept: VASCULAR SURGERY | Facility: CLINIC | Age: 71
End: 2021-07-28
Payer: MEDICARE

## 2021-07-28 VITALS — DIASTOLIC BLOOD PRESSURE: 78 MMHG | HEART RATE: 59 BPM | SYSTOLIC BLOOD PRESSURE: 132 MMHG

## 2021-07-28 PROCEDURE — 99204 OFFICE O/P NEW MOD 45 MIN: CPT

## 2021-07-28 RX ORDER — LEVOTHYROXINE SODIUM 0.3 MG/1
300 TABLET ORAL
Refills: 0 | Status: DISCONTINUED | COMMUNITY
End: 2021-07-28

## 2021-07-28 RX ORDER — SACCHAROMYCES BOULARDII 50 MG
250 CAPSULE ORAL
Refills: 0 | Status: ACTIVE | COMMUNITY

## 2021-07-28 RX ORDER — MESALAMINE 800 MG/1
800 TABLET, DELAYED RELEASE ORAL
Refills: 0 | Status: ACTIVE | COMMUNITY

## 2021-07-28 RX ORDER — PANTOPRAZOLE 40 MG/1
40 TABLET, DELAYED RELEASE ORAL
Refills: 0 | Status: ACTIVE | COMMUNITY

## 2021-07-28 NOTE — PHYSICAL EXAM
[JVD] : no jugular venous distention  [Normal Breath Sounds] : Normal breath sounds [Normal Rate and Rhythm] : normal rate and rhythm [2+] : left 2+ [Ankle Swelling (On Exam)] : not present [Ankle Swelling Bilaterally] : bilaterally  [Varicose Veins Of Lower Extremities] : bilaterally [] : of the left leg [Ankle Swelling On The Right] : mild [No Rash or Lesion] : No rash or lesion [de-identified] : Awake and Alert [de-identified] : spider veins bilateral ankles, no ulceration or skin breakdown [de-identified] : No gross motor or sensory deficits [de-identified] : Appropriate

## 2021-07-28 NOTE — ASSESSMENT
[FreeTextEntry1] : 69 yo male with stasis changes and spider veins of bilateral lower extremities left > right. He has no edema on physical exam. I am uncertain if he has venous insufficiency. He was scheduled for a venous duplex and will follow up when the results are available. I do not think it is necessary to wear compression stockings at this time.

## 2021-07-28 NOTE — HISTORY OF PRESENT ILLNESS
[FreeTextEntry1] : 69 yo male presents to the office with a discoloration of his left leg. He reports that he noticed the discoloration several months ago. He was seen by his PMD and was told he had venous disease. It was recommended that he make an appointment with a vascular doctor. He denies a history of DVT or SVT. He reports minimal edema with prolonged standing. He does not wear compression stockings.

## 2021-07-28 NOTE — CONSULT LETTER
[Dear  ___] : Dear  [unfilled], [Consult Letter:] : I had the pleasure of evaluating your patient, [unfilled]. [Please see my note below.] : Please see my note below. [Consult Closing:] : Thank you very much for allowing me to participate in the care of this patient.  If you have any questions, please do not hesitate to contact me. [Sincerely,] : Sincerely, [FreeTextEntry2] : Job Zavala [FreeTextEntry3] : Chris Reed MD, RPVI\par Chief, Vascular Surgery\par

## 2021-07-28 NOTE — REVIEW OF SYSTEMS
[Fever] : no fever [Chills] : no chills [Leg Claudication] : no intermittent leg claudication [Lower Ext Edema] : no extremity edema [Limb Pain] : no limb pain [Limb Swelling] : no limb swelling

## 2021-08-18 ENCOUNTER — APPOINTMENT (OUTPATIENT)
Dept: VASCULAR SURGERY | Facility: CLINIC | Age: 71
End: 2021-08-18
Payer: MEDICARE

## 2021-08-18 PROCEDURE — 93970 EXTREMITY STUDY: CPT

## 2021-09-08 ENCOUNTER — APPOINTMENT (OUTPATIENT)
Dept: VASCULAR SURGERY | Facility: CLINIC | Age: 71
End: 2021-09-08
Payer: MEDICARE

## 2021-09-08 VITALS
BODY MASS INDEX: 26.51 KG/M2 | HEIGHT: 73 IN | HEART RATE: 65 BPM | DIASTOLIC BLOOD PRESSURE: 66 MMHG | WEIGHT: 200 LBS | SYSTOLIC BLOOD PRESSURE: 148 MMHG

## 2021-09-08 PROCEDURE — 99213 OFFICE O/P EST LOW 20 MIN: CPT

## 2021-09-08 NOTE — DATA REVIEWED
[FreeTextEntry1] : Venous Duplex - left leg - No DVT or SVT, SVI left GSV below the knee, GSV in the thigh small

## 2021-09-08 NOTE — ASSESSMENT
[FreeTextEntry1] : 69 yo male with stasis changes and spider veins of bilateral lower extremities left > right. He has no edema on physical exam. He underwent a venous duplex which demonstrated no clinically significant venous insufficiency. I do not think there is an indication for further vascular intervention at this time. He has no edema and I do not think there is an indication to wear compression stockings. He will follow up in 6 months sooner if his symptoms worsen. \par \par \par 22 min spent

## 2021-09-08 NOTE — REVIEW OF SYSTEMS
[Fever] : no fever [Chills] : no chills [Leg Claudication] : no intermittent leg claudication [Lower Ext Edema] : no extremity edema [Limb Pain] : no limb pain [Limb Swelling] : no limb swelling [As Noted in HPI] : as noted in HPI

## 2021-09-08 NOTE — PHYSICAL EXAM
[JVD] : no jugular venous distention  [Normal Breath Sounds] : Normal breath sounds [Normal Rate and Rhythm] : normal rate and rhythm [2+] : right 2+ [Ankle Swelling (On Exam)] : not present [Ankle Swelling Bilaterally] : bilaterally  [Varicose Veins Of Lower Extremities] : bilaterally [] : of the left leg [Ankle Swelling On The Right] : mild [No Rash or Lesion] : No rash or lesion [de-identified] : Awake and Alert [de-identified] : spider veins bilateral ankles, no ulceration or skin breakdown [de-identified] : No gross motor or sensory deficits [de-identified] : Appropriate

## 2021-10-28 ENCOUNTER — LABORATORY RESULT (OUTPATIENT)
Age: 71
End: 2021-10-28

## 2021-10-28 ENCOUNTER — RESULT REVIEW (OUTPATIENT)
Age: 71
End: 2021-10-28

## 2021-11-10 ENCOUNTER — APPOINTMENT (OUTPATIENT)
Dept: ENDOCRINOLOGY | Facility: CLINIC | Age: 71
End: 2021-11-10
Payer: MEDICARE

## 2021-11-10 VITALS
WEIGHT: 198 LBS | SYSTOLIC BLOOD PRESSURE: 130 MMHG | DIASTOLIC BLOOD PRESSURE: 68 MMHG | OXYGEN SATURATION: 97 % | HEIGHT: 73 IN | HEART RATE: 60 BPM | BODY MASS INDEX: 26.24 KG/M2

## 2021-11-10 PROCEDURE — 99214 OFFICE O/P EST MOD 30 MIN: CPT

## 2021-11-15 NOTE — HISTORY OF PRESENT ILLNESS
[FreeTextEntry1] : Nov 10, 2021    in person\par \par PCP: Dr. Job Leon c/o Jamie p [phone]   f [phone]\par             (spec: Pulmonary, CCM, Sleep) ~1/2016\par             will see soon, ha flu shot\par            . \par             GI: Now Dr. Thai Ya p [phone] at   [address] [place] [address] Dr Mack1    Fax: [phone] (also his wife's doctor)\par            .\par             Hematologist:   Dr. 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  TSH may be a tad lower than needed, but because of GI issues, challenging to\par get just at lower limits of normal.\par \par Plan:  Same Rx.\par \par \par May 12, 2021    in person\par \par PCP: Dr. Job Leon c/o Jamie p [phone]   f [phone]\par             (spec: Pulmonary, CCM, Sleep) ~1/2016\par             will see soon, ha flu shot\par            . \par             GI: Now Dr. Thai Ya p [phone] at   [address] [facility] Dr Smith-1    Fax: [phone] (also his wife's doctor)\par            .\par             Hematologist: Deuce Darling p  fax [phone]->   Dr. 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 ([facility])\par             I-131 Rx\par \par \par Recent labs at [facility] on May 5 included \par WBC   6.87\par Hct 41.4 \par PSA 1.27\par B12 437    on monthly injection \par TSH 0.02\par T4 10.8  on levothyroxine 300 + 200 mcg daily:  Sun 200, Mon 500, T 400, Wed 500, Th 500, F 500 S 500 \par                           will lower by another 100:  Th will be 400 instead of 500\par 25 OH vit D 37    on 50,000 iu BIW \par LDL 46\par HDL 40 \par glucose 82\par calcium 9.0\par creatinine 1.05\par thyroglobulin tumor marker \par \par Imp:  TSH suppressed, will lower dose of thyroxine by another 100 mcg/week.\par Updated labs before visit in six months and US neck.  \par \par \par \par \par Nov 11, 2020     \par \par PCP: Dr. Job Leon c/o Jamie p [phone]   f [phone]\par             (spec: Pulmonary, CCM, Sleep) ~1/2016\par             will see soon, ha flu shot\par            . \par             GI: Now Dr. 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 ([facility])\par             I-131 Rx\par \par US thyroid AOK\par thyroblobulin undetectable.\par TSH suppressed.\par Possibly has a short fuse or just stress of \par \par Taking LT4 300 plus 200 x 6 days and 200 x 1 day.\par So can decrease the 200 one day to 100 that day.\par \par Examination: \par Constitutional:  Alert, well nourished, healthy appearance, no acute distress \par Eyes:  No proptosis, no lid lag\par Thyroid:\par Pulmonary:  No respiratory distress, no accessory muscle use; normal rate and effort\par Cardiac:  Normal rate\par Vascular: \par Endocrine:  No stigmata of Cushing’s Syndrome\par Musculoskeletal:  Normal gait, no involuntary movements\par Neurology:  No tremors\par Affect/Mood/Psych:  Oriented x 3; normal affect, normal insight/judgement, normal mood \par .\par  Impression:  Still follows with Hematology.    \par TSH of 0.01 may be lower than necessary even though bone density in 2015 was very good\par     at [facility] with T scroes LS + 2.3,  TH + 0.8       forearm T + 1.7\par Thyroglobulin and thyroglobulin antibody remains negative.\par \par Plan:  the one day a week where he takes only 200 mcg, he can decrease to 100 mcg.    \par \par \par \par \par \par Oct 14, 2019\par \par  PCP: Dr. Job Leon c/o Jamie p [phone]\par             f [phone]\par             (spec: Pulmonary, CCM, Sleep) ~1/2016\par             will see soon, ha flu shot\par            . \par             GI: Now Dr. Thai Ya p [phone] at\par             [address] [facility] Dr Saenz L-1\par             Fax: [phone] (also his wife's doctor)\par            .\par             Hematologist: Deuce Darling p  fax [phone]\par             [address] [facility]\par \par At last visit, TSH 0.08 T3 100, T4 11.3 while taking average daily dose of 457 mcg of levothyroxine (highest he ever required was\par ~ 600 mcg/d b/o Crohns and shorter bowel).  The 457 was generated by taking 200 mcg x 7 days and 300 mcg x 6 days,\par so we will consider 200 + 200 + 25 mcg \par \par \par Plans:   TFTs today and\par He is concerned about his vegan wife's iodine intake.  \par Labs today and again before next visit (at [facility])\par Ultrasound neck before next visit as well.\par He is comfortable with suppressed TSH and his bone density is far above average.  \par             \par \par \par March 22, 2019\par \par   PCP: Dr. Job Leon c/o Jamie p [phone]\par             f [phone]\par             (spec: Pulmonary, CCM, Sleep) ~1/2016\par             will see soon, ha flu shot\par            . \par             GI: Now Dr. Thai Ya p [phone] at\par             [address] [facility] [address] Mack[address]\par             Fax: [phone] (also his wife's doctor)\par            .\par             Hematologist: Deuce Darling p  fax [phone]\par             [address] [facility]\par             next ~ 12/2017\par             next July 2018 (q6 mos)\par            .\par \par Has new hematologist.  He believes Dr. Vann is at [facility].\par Dose of thyroxine now 300 mcg plus 200 mcg  but now skips the 300 once a week.\par \par Recent US neck at [facility] not changed.\par \par Plan:  Labs today.\par ROV.  \par             ENT: Dr. Jus Wang) for nasal papilloma - present for years\par             also saw [facility] but b/o advice they can come\par             back, he decided not to treat. \par             .\par \par \par \par Previous notes from [facility] [facility] appended below.\par \par \par   April 20, 2018\par    PCP: Dr. Job Leon c/o [facility] p [phone]\par             f [phone]\par             (spec: Pulmonary, CCM, Sleep) ~1/2016\par             will see soon, ha flu shot\par            . \par             GI: Now Dr. Thai Ya p [phone] at\par             [address] [facility] [address] Mack[address]\par             Fax: [phone] (also his wife's doctor)\par            .\par             Hematologist: Deuce Darling p  fax [phone]\par             [address] [facility]\par             next ~ 12/2017\par             next July 2018 (q6 mos)\par            .\par             ENT: Dr. Luu (Jorge) for nasal papilloma - present for years\par             also saw [facility] but b/o advice they can come\par             back, he decided not to treat. 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 \par            .\par            Taking levothyroxine 500 mgc (300 + 200 mcg) daily.\par            Has diarrhea and is on various treatments. \par            Takes low dose OTC Immodium\par            .\par            On antibiotic as he just had a dental implant. \par            Most recent 2018 ultrasound of neck/thyroid bed at [facility]:\par            .\par            CLINICAL HISTORY: Thyroid cancer \par             Thyroid Ultrasound \par             Real-time sonographic examination was performed on 4/4/2018 and compared to a previous study dated \par            9/28/2016. The patient is status post total thyroidectomy. A 2.5 x 0.5 x 1.9 cm mass with an \par            echogenic hilum is again noted within the right thyroid bed and likely to represent a lymph node. \par            It is grossly unchanged since the patient's previous study. No mass or residual thyroid tissue is \par            present within the left thyroid bed. \par             IMPRESSION: Status post total thyroidectomy. Stable lymph node within the right thyroid bed. \par            ***Electronically Signed *** \par            ----------------------------------------------- \par            Kahlil Banks MD \par            .\par            .\par            I reviewed with Mr. Fitzgerald the reports from 2015 and 2016\par            .\par            Impression: Very stable. Lymph node neck being monitored.\par            Plan: Next U/S Thyroid bed within next year. \par            TFTs and thyroglobulin today\par            ROV 6 months. \par            .\par            ==\par            .\par            October 25, 2017\par            .\par            PCP: Dr. Job Leon c/o Jamie p [phone]\par             f [phone]\par             (spec: Pulmonary, CCM, Sleep) ~1/2016\par             will see soon, ha flu shot\par            . \par             GI: Now Dr. Thai Ya p [phone] at\par             [address] [facility] [address] Saenz L-[address]\par             Fax: [phone]\par            .\par             Hematologist: Deuce De La Cruzdarlene p  fax [phone]\par             [address] [facility]\par             next ~ 12/2017\par            .\par             ENT: Dr. Luu ([facility]) for nasal papilloma\par             also saw [facility] but b/o advice they can come\par             back, he decided not to treat. 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 The dominant tumor mass microscopically a papillary carcinoma. Within the tumor itself, vascular invasion identified. Along the perimeter of the main tumor, multiple microscopic satellite nodules of papillary carcinoma, several of the foci closely approached the external thyroid capsule.....in addition to the main tumor, there were at least three separate nodules in the left lobe ranging in size from 0.1 to 1.0 cm and there was tumor invoving the right lobe. At least ten separate foci fo papillary carcinoma up to 0.4 cm...close to a total thyroidectomy ..." as noted in [facility] pathology report. \par            .\par            Most recent studies:\par            6/2015 - Sonogram - [facility] - several benign appearing lymph nodes in both carotid chains that are well circumscribed with echogenic jordy, larges 2.7 cm...(Dr. William Rivera) \par             6/2015 - Bone density: Spine +2.3, L hip T +0.8, forearm +1.7\par             L femoral neck T -0.4 \par            .\par            Currently taking levothyroxine 500: 300 plus 200 mcg daily. \par            Taking vitamin D 50,000 weekly, but he forgets and probably takes\par            about 3 pills a month.\par            . \par            Still has diarrhea; however, he believes this is related to resection of intestine rather than any flare. 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 TSH has been kept low and bone density is in good range. Serial ultrasound and thyroglobulin levels have been very reassuring. He has also required vitamin D for previously low levels, and has done well on supplementation. \par            .\par            Plan: Updated lab tests today. Results will be mailed to  Lia and faxed to the other members of his healthcare team. \par            . \par            Return in about six months and follow up ultrasound of neck before that.

## 2022-03-09 ENCOUNTER — APPOINTMENT (OUTPATIENT)
Dept: VASCULAR SURGERY | Facility: CLINIC | Age: 72
End: 2022-03-09
Payer: MEDICARE

## 2022-03-09 VITALS — DIASTOLIC BLOOD PRESSURE: 82 MMHG | SYSTOLIC BLOOD PRESSURE: 125 MMHG | HEART RATE: 73 BPM

## 2022-03-09 DIAGNOSIS — I87.8 OTHER SPECIFIED DISORDERS OF VEINS: ICD-10-CM

## 2022-03-09 DIAGNOSIS — I87.2 VENOUS INSUFFICIENCY (CHRONIC) (PERIPHERAL): ICD-10-CM

## 2022-03-09 PROCEDURE — 99213 OFFICE O/P EST LOW 20 MIN: CPT

## 2022-03-11 PROBLEM — I87.8 VENOUS STASIS OF LOWER EXTREMITY: Status: ACTIVE | Noted: 2021-07-28

## 2022-03-11 PROBLEM — I87.2 CHRONIC VENOUS INSUFFICIENCY: Status: ACTIVE | Noted: 2021-07-28

## 2022-03-11 NOTE — HISTORY OF PRESENT ILLNESS
[FreeTextEntry1] : 69 yo male presents to the office with a discoloration of his left leg. He reports that he noticed the discoloration several months ago. He was seen by his PMD and was told he had venous disease. It was recommended that he make an appointment with a vascular doctor. He denies a history of DVT or SVT. He reports minimal edema with prolonged standing. He does not wear compression stockings.  [de-identified] : 72 yo male returns in follow up. He continues to deny edema of his left leg. He reports that the skin discoloration is stable. He does not wear a compression stocking.

## 2022-03-11 NOTE — PHYSICAL EXAM
[JVD] : no jugular venous distention  [Normal Breath Sounds] : Normal breath sounds [Normal Rate and Rhythm] : normal rate and rhythm [2+] : right 2+ [Ankle Swelling (On Exam)] : not present [Ankle Swelling Bilaterally] : bilaterally  [Varicose Veins Of Lower Extremities] : bilaterally [] : of the left leg [Ankle Swelling On The Right] : mild [No Rash or Lesion] : No rash or lesion [de-identified] : Awake and Alert [de-identified] : spider veins bilateral ankles, no ulceration or skin breakdown [de-identified] : No gross motor or sensory deficits [de-identified] : Appropriate

## 2022-03-11 NOTE — CONSULT LETTER
[Dear  ___] : Dear  [unfilled], [Consult Letter:] : I had the pleasure of evaluating your patient, [unfilled]. [Please see my note below.] : Please see my note below. [Consult Closing:] : Thank you very much for allowing me to participate in the care of this patient.  If you have any questions, please do not hesitate to contact me. [Sincerely,] : Sincerely, [FreeTextEntry3] : Chris Reed MD, RPVI\par Chief, Vascular Surgery\par  [FreeTextEntry2] : Job Zavala

## 2022-03-11 NOTE — REVIEW OF SYSTEMS
[Chills] : no chills [Fever] : no fever [Lower Ext Edema] : no extremity edema [As Noted in HPI] : as noted in HPI

## 2022-05-04 ENCOUNTER — LABORATORY RESULT (OUTPATIENT)
Age: 72
End: 2022-05-04

## 2022-05-04 ENCOUNTER — APPOINTMENT (OUTPATIENT)
Dept: ENDOCRINOLOGY | Facility: CLINIC | Age: 72
End: 2022-05-04
Payer: MEDICARE

## 2022-05-04 VITALS
OXYGEN SATURATION: 98 % | HEART RATE: 72 BPM | BODY MASS INDEX: 26.51 KG/M2 | RESPIRATION RATE: 16 BRPM | HEIGHT: 73 IN | DIASTOLIC BLOOD PRESSURE: 64 MMHG | WEIGHT: 200 LBS | SYSTOLIC BLOOD PRESSURE: 144 MMHG

## 2022-05-04 PROCEDURE — 99214 OFFICE O/P EST MOD 30 MIN: CPT

## 2022-05-06 NOTE — HISTORY OF PRESENT ILLNESS
[FreeTextEntry1] : May 04, 2022     in person\par \par PCP: Dr. Job Leon c/o [facility] p [phone]   f [phone]\par             (spec: Pulmonary, CCM, Sleep) ~1/2016\par             will see soon, ha flu shot\par            . \par             GI: Now Dr. Thai Ya p [phone] at   [address] [place] [address] Dr Smith-1    Fax: [phone] (also his wife's doctor)\par            .\par             Hematologist:   Dr. 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)\par             I-131 Rx\par \par Recent US neck at [facility]:  negative\par \par \par Nov 10, 2021    in person\par \par PCP: Dr. Job Leon c/o [facility] p [phone]   f [phone]\par             (spec: Pulmonary, CCM, Sleep) ~1/2016\par             will see soon, ha flu shot\par            . \par             GI: Now Dr. Thai Ya p [phone] at   [address] [place] [address] Dr Saenz L-1    Fax: [phone] (also his wife's doctor)\par            .\par             Hematologist:   Dr. 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)\par             I-131 Rx\par \par Recent US neck at [facility]:  negative\par Recent thyroglobulin and thyroglobulin ab negative.\par TSH is suppressed.\par Has Crohn's and short bowel.\par Getting TSH just right challenging.\par Max dose of levothyroxine was 600 mcg daily and he is now down to\par 400 \par \par Impression:  Hypothyroidism well controlled.  TSH may be a tad lower than needed, but because of GI issues, challenging to\par get just at lower limits of normal.\par \par Plan:  Same Rx.\par \par \par May 12, 2021    in person\par \par PCP: Dr. Job Leon c/o [facility] p [phone]   f [phone]\par             (spec: Pulmonary, CCM, Sleep) ~1/2016\par             will see soon, ha flu shot\par            . \par             GI: Now Dr. Thai Ya p [phone] at   [address] [facility] [address] Mack[address]    Fax: [phone] (also his wife's doctor)\par            .\par             Hematologist: Deuce Darling p  fax [phone]->   Dr. 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)\par             I-131 Rx\par \par \par Recent labs at [facility] on May 5 included \par WBC   6.87\par Hct 41.4 \par PSA 1.27\par B12 437    on monthly injection \par TSH 0.02\par T4 10.8  on levothyroxine 300 + 200 mcg daily:  Sun 200, Mon 500, T 400, Wed 500, Th 500, F 500 S 500 \par                           will lower by another 100:  Th will be 400 instead of 500\par 25 OH vit D 37    on 50,000 iu BIW \par LDL 46\par HDL 40 \par glucose 82\par calcium 9.0\par creatinine 1.05\par thyroglobulin tumor marker \par \par Imp:  TSH suppressed, will lower dose of thyroxine by another 100 mcg/week.\par Updated labs before visit in six months and US neck.  \par \par \par \par \par Nov 11, 2020     \par \par PCP: Dr. Job Leon c/o [facility] p [phone]   f [phone]\par             (spec: Pulmonary, CCM, Sleep) ~1/2016\par             will see soon, ha flu shot\par            . \par             GI: Now Dr. 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)\par             I-131 Rx\par \par US thyroid AOK\par thyroblobulin undetectable.\par TSH suppressed.\par Possibly has a short fuse or just stress of \par \par Taking LT4 300 plus 200 x 6 days and 200 x 1 day.\par So can decrease the 200 one day to 100 that day.\par \par Examination: \par Constitutional:  Alert, well nourished, healthy appearance, no acute distress \par Eyes:  No proptosis, no lid lag\par Thyroid:\par Pulmonary:  No respiratory distress, no accessory muscle use; normal rate and effort\par Cardiac:  Normal rate\par Vascular: \par Endocrine:  No stigmata of Cushing’s Syndrome\par Musculoskeletal:  Normal gait, no involuntary movements\par Neurology:  No tremors\par Affect/Mood/Psych:  Oriented x 3; normal affect, normal insight/judgement, normal mood \par .\par  Impression:  Still follows with Hematology.    \par TSH of 0.01 may be lower than necessary even though bone density in 2015 was very good\par     at [facility] with T scroes LS + 2.3,  TH + 0.8       forearm T + 1.7\par Thyroglobulin and thyroglobulin antibody remains negative.\par \par Plan:  the one day a week where he takes only 200 mcg, he can decrease to 100 mcg.    \par \par \par \par \par \par Oct 14, 2019\par \par  PCP: Dr. Job Leon c/o [facility] p [phone]\par             f [phone]\par             (spec: Pulmonary, CCM, Sleep) ~1/2016\par             will see soon, ha flu shot\par            . \par             GI: Now Dr. Thai Ya p [phone] at\par             [address] [facility] [address] Saenz L-1\par             Fax: [phone] (also his wife's doctor)\par            .\par             Hematologist: Deuce Darling p  fax [phone]\par             [address] [facility]\par \par At last visit, TSH 0.08 T3 100, T4 11.3 while taking average daily dose of 457 mcg of levothyroxine (highest he ever required was\par ~ 600 mcg/d b/o Crohns and shorter bowel).  The 457 was generated by taking 200 mcg x 7 days and 300 mcg x 6 days,\par so we will consider 200 + 200 + 25 mcg \par \par \par Plans:   TFTs today and\par He is concerned about his vegan wife's iodine intake.  \par Labs today and again before next visit (at [facility])\par Ultrasound neck before next visit as well.\par He is comfortable with suppressed TSH and his bone density is far above average.  \par             \par \par \par March 22, 2019\par \par   PCP: Dr. Job Leon c/o [facility] p [phone]\par             f [phone]\par             (spec: Pulmonary, CCM, Sleep) ~1/2016\par             will see soon, ha flu shot\par            . \par             GI: Now Dr. Thai Ya p [phone] at\par             [address] [facility] [address] Saenz L-1\par             Fax: [phone] (also his wife's doctor)\par            .\par             Hematologist: Deuce Darling p  fax [phone]\par             [address] [facility]\par             next ~ 12/2017\par             next July 2018 (q6 mos)\par            .\par \par Has new hematologist.  He believes Dr. Vann is at [facility].\par Dose of thyroxine now 300 mcg plus 200 mcg  but now skips the 300 once a week.\par \par Recent US neck at [facility] not changed.\par \par Plan:  Labs today.\par ROV.  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 \par             .\par \par \par \par Previous notes from [employer] appended below.\par \par \par   April 20, 2018\par    PCP: Dr. Job Leon c/o [facility] p [phone]\par             f [phone]\par             (spec: Pulmonary, CCM, Sleep) ~1/2016\par             will see soon, ha flu shot\par            . \par             GI: Now Dr. 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)\par             I-131 Rx\par            .\par            66 yo - feels well, has some fatigue.\par            Taking levothyroxine religiously. \par            .\par            Taking levothyroxine 500 mgc (300 + 200 mcg) daily.\par            Has diarrhea and is on various treatments. \par            Takes low dose OTC Immodium\par            .\par            On antibiotic as he just had a dental implant. \par            Most recent 2018 ultrasound of neck/thyroid bed at [facility]:\par            .\par            CLINICAL HISTORY: Thyroid cancer \par             Thyroid Ultrasound \par             Real-time sonographic examination was performed on 4/4/2018 and compared to a previous study dated \par            9/28/2016. The patient is status post total thyroidectomy. A 2.5 x 0.5 x 1.9 cm mass with an \par            echogenic hilum is again noted within the right thyroid bed and likely to represent a lymph node. \par            It is grossly unchanged since the patient's previous study. No mass or residual thyroid tissue is \par            present within the left thyroid bed. \par             IMPRESSION: Status post total thyroidectomy. Stable lymph node within the right thyroid bed. \par            ***Electronically Signed *** \par            ----------------------------------------------- \par            Kahlil Banks MD \par            .\par            .\par            I reviewed with Mr. Fitzgerald the reports from 2015 and 2016\par            .\par            Impression: Very stable. Lymph node neck being monitored.\par            Plan: Next U/S Thyroid bed within next year. \par            TFTs and thyroglobulin today\par            ROV 6 months. \par            .\par            ==\par            .\par            October 25, 2017\par            .\par            PCP: Dr. Job Leon c/o [facility] p [phone]\par             f [phone]\par             (spec: Pulmonary, CCM, Sleep) ~1/2016\par             will see soon, ha flu shot\par            . \par             GI: Now Dr. Thai Ya p [phone] at\par             [address] [facility] [address] Saenz L-1\par             Fax: [phone]\par            .\par             Hematologist: Deuce Darling p  fax [phone]\par             [address] [place] [facility]\par             next ~ 12/2017\par            .\par             ENT: Dr. Luu ([place]) for nasal papilloma\par             also saw [facility] but b/o advice they can come\par             back, he decided not to treat. 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)\par             I-131 Rx\par             .\par             (Crohn's disease)\par             Vitamin D deficiency\par             Low platelets & platelet clumping\par             Preclinical "CLL" (2015: 3 % atypical lymphocytes)\par            .\par            -1992 - [facility] - Left thyroid lobectomy - multifocal papillary thyroid cancer: "Mass in the left lobe of the thyroid - 2 cm firm well circumscribed nodule within a 3.3 cm fleshy maroon nodule. The dominant tumor mass microscopically a papillary carcinoma. Within the tumor itself, vascular invasion identified. Along the perimeter of the main tumor, multiple microscopic satellite nodules of papillary carcinoma, several of the foci closely approached the external thyroid capsule.....in addition to the main tumor, there were at least three separate nodules in the left lobe ranging in size from 0.1 to 1.0 cm and there was tumor invoving the right lobe. At least ten separate foci fo papillary carcinoma up to 0.4 cm...close to a total thyroidectomy ..." as noted in [facility] pathology report. \par            .\par            Most recent studies:\par            6/2015 - Sonogram - [facility] - several benign appearing lymph nodes in both carotid chains that are well circumscribed with echogenic jordy, larges 2.7 cm...(Dr. William Rivera) \par             6/2015 - Bone density: Spine +2.3, L hip T +0.8, forearm +1.7\par             L femoral neck T -0.4 \par            .\par            Currently taking levothyroxine 500: 300 plus 200 mcg daily. \par            Taking vitamin D 50,000 weekly, but he forgets and probably takes\par            about 3 pills a month.\par            . \par            Still has diarrhea; however, he believes this is related to resection of intestine rather than any flare. 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 TSH has been kept low and bone density is in good range. Serial ultrasound and thyroglobulin levels have been very reassuring. He has also required vitamin D for previously low levels, and has done well on supplementation. \par            .\par            Plan: Updated lab tests today. Results will be mailed to  Lia and faxed to the other members of his healthcare team. \par            . \par            Return in about six months and follow up ultrasound of neck before that.

## 2022-05-06 NOTE — HISTORY OF PRESENT ILLNESS
[FreeTextEntry1] : May 04, 2022     in person\par \par PCP: Dr. Job Leon c/o Jamie p [phone]   f [phone]\par             (spec: Pulmonary, CCM, Sleep) ~1/2016\par             will see soon, ha flu shot\par            . \par             GI: Now Dr. Thai Ya p [phone] at   [address] [place] [address] Dr Smith-1    Fax: [phone] (also his wife's doctor)\par            .\par             Hematologist:   Dr. 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 Job Leon c/o Jamie p [phone]   f [phone]\par             (spec: Pulmonary, CCM, Sleep) ~1/2016\par             will see soon, ha flu shot\par            . \par             GI: Now Dr. Thai Ya p [phone] at   [address] [place] [address] Dr Saenz L-1    Fax: [phone] (also his wife's doctor)\par            .\par             Hematologist:   Dr. 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  TSH may be a tad lower than needed, but because of GI issues, challenging to\par get just at lower limits of normal.\par \par Plan:  Same Rx.\par \par \par May 12, 2021    in person\par \par PCP: Dr. Job Leon c/o Jamie p [phone]   f [phone]\par             (spec: Pulmonary, CCM, Sleep) ~1/2016\par             will see soon, ha flu shot\par            . \par             GI: Now Dr. Thai Ya p [phone] at   [address] [facility] [address] Mack[address]    Fax: [phone] (also his wife's doctor)\par            .\par             Hematologist: Deuce Darling p  fax [phone]->   Dr. 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 ([facility])\par             I-131 Rx\par \par \par Recent labs at [facility] on May 5 included \par WBC   6.87\par Hct 41.4 \par PSA 1.27\par B12 437    on monthly injection \par TSH 0.02\par T4 10.8  on levothyroxine 300 + 200 mcg daily:  Sun 200, Mon 500, T 400, Wed 500, Th 500, F 500 S 500 \par                           will lower by another 100:  Th will be 400 instead of 500\par 25 OH vit D 37    on 50,000 iu BIW \par LDL 46\par HDL 40 \par glucose 82\par calcium 9.0\par creatinine 1.05\par thyroglobulin tumor marker \par \par Imp:  TSH suppressed, will lower dose of thyroxine by another 100 mcg/week.\par Updated labs before visit in six months and US neck.  \par \par \par \par \par Nov 11, 2020     \par \par [facility]: [phone]   f [phone]\par             (spec: Pulmonary, CCM, Sleep) ~1/2016\par             will see soon, ha flu shot\par            . \par             GI: Now Dr. 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 ([facility])\par             I-131 Rx\par \par US thyroid AOK\par thyroblobulin undetectable.\par TSH suppressed.\par Possibly has a short fuse or just stress of \par \par Taking LT4 300 plus 200 x 6 days and 200 x 1 day.\par So can decrease the 200 one day to 100 that day.\par \par Examination: \par Constitutional:  Alert, well nourished, healthy appearance, no acute distress \par Eyes:  No proptosis, no lid lag\par Thyroid:\par Pulmonary:  No respiratory distress, no accessory muscle use; normal rate and effort\par Cardiac:  Normal rate\par Vascular: \par Endocrine:  No stigmata of Cushing’s Syndrome\par Musculoskeletal:  Normal gait, no involuntary movements\par Neurology:  No tremors\par Affect/Mood/Psych:  Oriented x 3; normal affect, normal insight/judgement, normal mood \par .\par  Impression:  Still follows with Hematology.    \par TSH of 0.01 may be lower than necessary even though bone density in 2015 was very good\par     at [facility] with T scroes LS + 2.3,  TH + 0.8       forearm T + 1.7\par Thyroglobulin and thyroglobulin antibody remains negative.\par \par Plan:  the one day a week where he takes only 200 mcg, he can decrease to 100 mcg.    \par \par \par \par \par \par Oct 14, 2019\par \par  [facility] [phone]\par             f [phone]\par             (spec: Pulmonary, CCM, Sleep) ~1/2016\par             will see soon, ha flu shot\par            . \par             GI: Now Dr. Thai Ya p [phone] at\par             [address] [facility] Dr Saenz L-1\par             Fax: [phone] (also his wife's doctor)\par            .\par             Hematologist: Deuce Darling p  fax [phone]\par             [address] [facility]\par \par At last visit, TSH 0.08 T3 100, T4 11.3 while taking average daily dose of 457 mcg of levothyroxine (highest he ever required was\par ~ 600 mcg/d b/o Crohns and shorter bowel).  The 457 was generated by taking 200 mcg x 7 days and 300 mcg x 6 days,\par so we will consider 200 + 200 + 25 mcg \par \par \par Plans:   TFTs today and\par He is concerned about his vegan wife's iodine intake.  \par Labs today and again before next visit (at [facility])\par Ultrasound neck before next visit as well.\par He is comfortable with suppressed TSH and his bone density is far above average.  \par             \par \par \par March 22, 2019\par \par   PCP: Dr. Job Leon c/o Jamie p [phone]\par             f [phone]\par             (spec: Pulmonary, CCM, Sleep) ~1/2016\par             will see soon, ha flu shot\par            . \par             GI: Now Dr. Thai Ya p [phone] at\par             [address] [facility] Dr Saenz L-1\par             Fax: [phone] (also his wife's doctor)\par            .\par             Hematologist: Deuce Darling p  fax [phone]\par             [address] [facility]\par             next ~ 12/2017\par             next July 2018 (q6 mos)\par            .\par \par Has new hematologist.  He believes Dr. Vann is at [facility].\par Dose of thyroxine now 300 mcg plus 200 mcg  but now skips the 300 once a week.\par \par Recent US neck at [facility] not changed.\par \par Plan:  Labs today.\par ROV.  \par             ENT: Dr. Luu ([facility]) for nasal papilloma - present for years\par             [facility] but b/o advice they can come\par             back, he decided not to treat. \par             .\par \par \par \par Previous notes from [employer] appended below.\par \par \par   April 20, 2018\par    PCP: Dr. Job Leon c/o Jamie p [phone]\par             f [phone]\par             (spec: Pulmonary, CCM, Sleep) ~1/2016\par             will see soon, ha flu shot\par            . \par             GI: Now Dr. Thai Ya p [phone] at\par             [address] [facility] Dr Smith-1\par             Fax: [phone] (also his wife's doctor)\par            .\par             Hematologist: Deuce Darling p  fax [phone]\par             [address] [facility]\par             next ~ 12/2017\par             next July 2018 (q6 mos)\par            .\par             ENT: Dr. Luu ([facility]) for nasal papilloma - present for years\par             [facility] but b/o advice they can come\par             back, he decided not to treat. 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 \par            .\par            Taking levothyroxine 500 mgc (300 + 200 mcg) daily.\par            Has diarrhea and is on various treatments. \par            Takes low dose OTC Immodium\par            .\par            On antibiotic as he just had a dental implant. \par            Most recent 2018 ultrasound of neck/thyroid bed at [facility]:\par            .\par            CLINICAL HISTORY: Thyroid cancer \par             Thyroid Ultrasound \par             Real-time sonographic examination was performed on 4/4/2018 and compared to a previous study dated \par            9/28/2016. The patient is status post total thyroidectomy. A 2.5 x 0.5 x 1.9 cm mass with an \par            echogenic hilum is again noted within the right thyroid bed and likely to represent a lymph node. \par            It is grossly unchanged since the patient's previous study. No mass or residual thyroid tissue is \par            present within the left thyroid bed. \par             IMPRESSION: Status post total thyroidectomy. Stable lymph node within the right thyroid bed. \par            ***Electronically Signed *** \par            ----------------------------------------------- \par            Kahlil Banks MD \par            .\par            .\par            I reviewed with Mr. Fitzgerald the reports from 2015 and 2016\par            .\par            Impression: Very stable. Lymph node neck being monitored.\par            Plan: Next U/S Thyroid bed within next year. \par            TFTs and thyroglobulin today\par            ROV 6 months. \par            .\par            ==\par            .\par            October 25, 2017\par            .\par            PCP: Dr. Job Leon c/o [facility] p [phone]\par             f [phone]\par             (spec: Pulmonary, CCM, Sleep) ~1/2016\par             will see soon, ha flu shot\par            . \par             GI: Now Dr. Thai Ya p [phone] at\par             [address] [facility] [address] Saenz L-1\par             Fax: [phone]\par            .\par             Hematologist: Deuce Darling p  fax [phone]\par             [address] [facility]\par             next ~ 12/2017\par            .\par             ENT: Dr. Luu ([facility]) for nasal papilloma\par             also [facility] but b/o advice they can come\par             back, he decided not to treat. 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 The dominant tumor mass microscopically a papillary carcinoma. Within the tumor itself, vascular invasion identified. Along the perimeter of the main tumor, multiple microscopic satellite nodules of papillary carcinoma, several of the foci closely approached the external thyroid capsule.....in addition to the main tumor, there were at least three separate nodules in the left lobe ranging in size from 0.1 to 1.0 cm and there was tumor invoving the right lobe. At least ten separate foci fo papillary carcinoma up to 0.4 cm...close to a total thyroidectomy ..." as noted in [facility] pathology report. \par            .\par            Most recent studies:\par            6/2015 - Sonogram - [facility] - several benign appearing lymph nodes in both carotid chains that are well circumscribed with echogenic jordy, larges 2.7 cm...(Dr. William Rivera) \par             6/2015 - Bone density: Spine +2.3, L hip T +0.8, forearm +1.7\par             L femoral neck T -0.4 \par            .\par            Currently taking levothyroxine 500: 300 plus 200 mcg daily. \par            Taking vitamin D 50,000 weekly, but he forgets and probably takes\par            about 3 pills a month.\par            . \par            Still has diarrhea; however, he believes this is related to resection of intestine rather than any flare. 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 TSH has been kept low and bone density is in good range. Serial ultrasound and thyroglobulin levels have been very reassuring. He has also required vitamin D for previously low levels, and has done well on supplementation. \par            .\par            Plan: Updated lab tests today. Results will be mailed to  Lia and faxed to the other members of his healthcare team. \par            . \par            Return in about six months and follow up ultrasound of neck before that.

## 2022-05-16 LAB
25(OH)D3 SERPL-MCNC: 48 NG/ML
ALBUMIN SERPL ELPH-MCNC: 4 G/DL
ALP BLD-CCNC: 75 U/L
ALT SERPL-CCNC: 16 U/L
ANION GAP SERPL CALC-SCNC: 11 MMOL/L
AST SERPL-CCNC: 19 U/L
BASOPHILS # BLD AUTO: 0.04 K/UL
BASOPHILS NFR BLD AUTO: 0.6 %
BILIRUB DIRECT SERPL-MCNC: 0.1 MG/DL
BILIRUB INDIRECT SERPL-MCNC: 0.3 MG/DL
BILIRUB SERPL-MCNC: 0.4 MG/DL
BUN SERPL-MCNC: 11 MG/DL
CALCIUM SERPL-MCNC: 9.3 MG/DL
CHLORIDE SERPL-SCNC: 105 MMOL/L
CO2 SERPL-SCNC: 27 MMOL/L
CREAT SERPL-MCNC: 0.99 MG/DL
EGFR: 80 ML/MIN/1.73M2
EOSINOPHIL # BLD AUTO: 0.13 K/UL
EOSINOPHIL NFR BLD AUTO: 2 %
GLUCOSE SERPL-MCNC: 87 MG/DL
HCT VFR BLD CALC: 43.2 %
HGB BLD-MCNC: 14.1 G/DL
IMM GRANULOCYTES NFR BLD AUTO: 0.3 %
LYMPHOCYTES # BLD AUTO: 2.48 K/UL
LYMPHOCYTES NFR BLD AUTO: 38.4 %
MAN DIFF?: NORMAL
MCHC RBC-ENTMCNC: 30.8 PG
MCHC RBC-ENTMCNC: 32.6 GM/DL
MCV RBC AUTO: 94.3 FL
MONOCYTES # BLD AUTO: 0.46 K/UL
MONOCYTES NFR BLD AUTO: 7.1 %
NEUTROPHILS # BLD AUTO: 3.33 K/UL
NEUTROPHILS NFR BLD AUTO: 51.6 %
PLATELET # BLD AUTO: 198 K/UL
POTASSIUM SERPL-SCNC: 4.5 MMOL/L
PROT SERPL-MCNC: 6.7 G/DL
RBC # BLD: 4.58 M/UL
RBC # FLD: 12.9 %
SODIUM SERPL-SCNC: 142 MMOL/L
T4 SERPL-MCNC: 11.1 UG/DL
THYROGLOB AB SERPL IA-ACNC: <1.8 IU/ML
TSH SERPL-ACNC: 0.04 UIU/ML
WBC # FLD AUTO: 6.46 K/UL

## 2022-09-06 DIAGNOSIS — E11.9 TYPE 2 DIABETES MELLITUS W/OUT COMPLICATIONS: ICD-10-CM

## 2022-09-19 ENCOUNTER — RESULT REVIEW (OUTPATIENT)
Age: 72
End: 2022-09-19

## 2022-09-23 ENCOUNTER — RESULT REVIEW (OUTPATIENT)
Age: 72
End: 2022-09-23

## 2022-11-04 ENCOUNTER — APPOINTMENT (OUTPATIENT)
Dept: ENDOCRINOLOGY | Facility: CLINIC | Age: 72
End: 2022-11-04

## 2022-11-04 ENCOUNTER — LABORATORY RESULT (OUTPATIENT)
Age: 72
End: 2022-11-04

## 2022-11-04 VITALS
BODY MASS INDEX: 26.11 KG/M2 | DIASTOLIC BLOOD PRESSURE: 77 MMHG | OXYGEN SATURATION: 98 % | HEART RATE: 74 BPM | SYSTOLIC BLOOD PRESSURE: 135 MMHG | HEIGHT: 73 IN | WEIGHT: 197 LBS

## 2022-11-04 DIAGNOSIS — M81.8 OTHER OSTEOPOROSIS W/OUT CURRENT PATHOLOGICAL FRACTURE: ICD-10-CM

## 2022-11-04 PROCEDURE — 36415 COLL VENOUS BLD VENIPUNCTURE: CPT

## 2022-11-04 PROCEDURE — 99214 OFFICE O/P EST MOD 30 MIN: CPT | Mod: 25

## 2022-11-04 NOTE — HISTORY OF PRESENT ILLNESS
[FreeTextEntry1] : Nov 04, 2022      in person\par \par PCP: Dr. Job Leon c/o Jamie p [phone]   f [phone]\par             (spec: Pulmonary, CCM, Sleep) ~1/2016\par             will see soon, ha flu shot\par            . \par             GI: Now Dr. Thai Ya p [phone] at   [address] [facility] [address]    Fax: [phone] (also his wife's doctor)\par            .\par             Hematologist:   Dr. 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   "VFA" also WNL.\par Has not had recent blood tests.\par \par : :\par Constitutional:  Alert, well nourished, healthy appearance, no acute distress \par Eyes:  No proptosis, no stare\par Thyroid:\par Pulmonary:  No respiratory distress, no accessory muscle use; normal rate and effort\par Cardiac:  Normal rate\par Vascular: \par Endocrine:  No stigmata of Cushing’s Syndrome\par Musculoskeletal:  Normal gait, no involuntary movements\par Neurology:  No tremors\par Affect/Mood/Psych:  Oriented x 3; normal affect, normal insight/judgement, normal mood \par .\par \par Impression:  Time for updated labs       \par CT chest shows 2-4 mm nodules  and gallstones.\par \par Plan:  ROV six months.  \par \par \par \par May 04, 2022     in person\par \par PCP: Dr. Job Leon c/o Jamie p [phone]   f [phone]\par             (spec: Pulmonary, CCM, Sleep) ~1/2016\par             will see soon, ha flu shot\par            . \par             GI: Now Dr. Thai Ya p [phone] at   [address] [facility] [address]    Fax: [phone] (also his wife's doctor)\par            .\par             Hematologist:   Dr. 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 ([facility])\par             I-131 Rx\par \par Recent US neck at [place] 10/2021:  negative\par 500 mcg x 5 days a week\par 300 mcg x 1 daay and\par 200 mcg x 1 day\par \par : :\par Constitutional:  Alert, well nourished, healthy appearance, no acute distress \par Eyes:  No proptosis, no stare\par Thyroid:  no palpable tissue \par Pulmonary:  No respiratory distress, no accessory muscle use; normal rate and effort\par Cardiac:  Normal rate\par Vascular: \par Endocrine:  No stigmata of Cushing’s Syndrome\par Musculoskeletal:  Normal gait, no involuntary movements\par Neurology:  No tremors\par Affect/Mood/Psych:  Oriented x 3; normal affect, normal insight/judgement, normal mood \par .\par \par Impresion doing very well \par Plan:  Same Rx.  -jh\par \par \par Nov 10, 2021    in person\par \par PCP: Dr. Job Leon c/o Jamie p [phone]   f [phone]\par             (spec: Pulmonary, CCM, Sleep) ~1/2016\par             will see soon, ha flu shot\par            . \par             GI: Now Dr. Thai Ya p [phone] at   [address] [facility] [address]    Fax: [phone] (also his wife's doctor)\par            .\par             Hematologist:   Dr. 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  TSH may be a tad lower than needed, but because of GI issues, challenging to\par get just at lower limits of normal.\par \par Plan:  Same Rx.\par \par \par May 12, 2021    in person\par \par PCP: Dr. Job Leon c/o Jamie p [phone]   f [phone]\par             (spec: Pulmonary, CCM, Sleep) ~1/2016\par             will see soon, ha flu shot\par            . \par             GI: Now Dr. Thai Ya p [phone] at   [address] [facility] [address]    Fax: [phone] (also his wife's doctor)\par            .\par             Hematologist: Deuce Darling p  fax [phone]->   Dr. 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 ([facility])\par             I-131 Rx\par \par \par Recent labs at [place] on May 5 included \par WBC   6.87\par Hct 41.4 \par PSA 1.27\par B12 437    on monthly injection \par TSH 0.02\par T4 10.8  on levothyroxine 300 + 200 mcg daily:  Sun 200, Mon 500, T 400, Wed 500, Th 500, F 500 S 500 \par                           will lower by another 100:  Th will be 400 instead of 500\par 25 OH vit D 37    on 50,000 iu BIW \par LDL 46\par HDL 40 \par glucose 82\par calcium 9.0\par creatinine 1.05\par thyroglobulin tumor marker \par \par Imp:  TSH suppressed, will lower dose of thyroxine by another 100 mcg/week.\par Updated labs before visit in six months and US neck.  \par \par \par \par \par Nov 11, 2020     \par \par PCP: Dr. Job Leon c/o Jamie p [phone]   f [phone]\par             (spec: Pulmonary, CCM, Sleep) ~1/2016\par             will see soon, ha flu shot\par            . \par             GI: Now Dr. 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 ([facility])\par             I-131 Rx\par \par US thyroid AOK\par thyroblobulin undetectable.\par TSH suppressed.\par Possibly has a short fuse or just stress of \par \par Taking LT4 300 plus 200 x 6 days and 200 x 1 day.\par So can decrease the 200 one day to 100 that day.\par \par Examination: \par Constitutional:  Alert, well nourished, healthy appearance, no acute distress \par Eyes:  No proptosis, no lid lag\par Thyroid:\par Pulmonary:  No respiratory distress, no accessory muscle use; normal rate and effort\par Cardiac:  Normal rate\par Vascular: \par Endocrine:  No stigmata of Cushing’s Syndrome\par Musculoskeletal:  Normal gait, no involuntary movements\par Neurology:  No tremors\par Affect/Mood/Psych:  Oriented x 3; normal affect, normal insight/judgement, normal mood \par .\par  Impression:  Still follows with Hematology.    \par TSH of 0.01 may be lower than necessary even though bone density in 2015 was very good\par     at [place] with T scroes LS + 2.3,  TH + 0.8       forearm T + 1.7\par Thyroglobulin and thyroglobulin antibody remains negative.\par \par Plan:  the one day a week where he takes only 200 mcg, he can decrease to 100 mcg.    \par \par \par \par \par \par Oct 14, 2019\par \par  PCP: Dr. Job Leon c/o Jamie p [phone]\par             f [phone]\par             (spec: Pulmonary, CCM, Sleep) ~1/2016\par             will see soon, ha flu shot\par            . \par             GI: Now Dr. Thai Ya p [phone] at\par             [address]\par             Fax: [phone] (also his wife's doctor)\par            .\par             Hematologist: eDuce Darling p  fax [phone]\par             [address] [facility]\par \par At last visit, TSH 0.08 T3 100, T4 11.3 while taking average daily dose of 457 mcg of levothyroxine (highest he ever required was\par ~ 600 mcg/d b/o Crohns and shorter bowel).  The 457 was generated by taking 200 mcg x 7 days and 300 mcg x 6 days,\par so we will consider 200 + 200 + 25 mcg \par \par \par Plans:   TFTs today and\par He is concerned about his vegan wife's iodine intake.  \par Labs today and again before next visit (at [place])\par Ultrasound neck before next visit as well.\par He is comfortable with suppressed TSH and his bone density is far above average.  \par             \par \par \par March 22, 2019\par \par   PCP: Dr. Job Leon c/o [facility] p [phone]\par             f [phone]\par             (spec: Pulmonary, CCM, Sleep) ~1/2016\par             will see soon, ha flu shot\par            . \par             GI: Now Dr. Thai Ya p [phone] at\par             [address]\par             Fax: [phone] (also his wife's doctor)\par            .\par             Hematologist: Deuce Darling p  fax [phone]\par             [address] [facility]\par             next ~ 12/2017\par             next July 2018 (q6 mos)\par            .\par \par Has new hematologist.  He believes Dr. Vann is at [facility].\par Dose of thyroxine now 300 mcg plus 200 mcg  but now skips the 300 once a week.\par \par Recent US neck at [place] not changed.\par \par Plan:  Labs today.\par ROV.  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 \par             .\par \par \par \par Previous notes from [employer] appended below.\par \par \par   April 20, 2018\par    PCP: Dr. Job Leon c/o Jamie p [phone]\par             f [phone]\par             (spec: Pulmonary, CCM, Sleep) ~1/2016\par             will see soon, ha flu shot\par            . \par             GI: Now Dr. 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 \par            .\par            Taking levothyroxine 500 mgc (300 + 200 mcg) daily.\par            Has diarrhea and is on various treatments. \par            Takes low dose OTC Immodium\par            .\par            On antibiotic as he just had a dental implant. \par            Most recent 2018 ultrasound of neck/thyroid bed at [place]:\par            .\par            CLINICAL HISTORY: Thyroid cancer \par             Thyroid Ultrasound \par             Real-time sonographic examination was performed on 4/4/2018 and compared to a previous study dated \par            9/28/2016. The patient is status post total thyroidectomy. A 2.5 x 0.5 x 1.9 cm mass with an \par            echogenic hilum is again noted within the right thyroid bed and likely to represent a lymph node. \par            It is grossly unchanged since the patient's previous study. No mass or residual thyroid tissue is \par            present within the left thyroid bed. \par             IMPRESSION: Status post total thyroidectomy. Stable lymph node within the right thyroid bed. \par            ***Electronically Signed *** \par            ----------------------------------------------- \par            Kahlil Banks MD \par            .\par            .\par            I reviewed with Mr. Fitzgerald the reports from 2015 and 2016\par            .\par            Impression: Very stable. Lymph node neck being monitored.\par            Plan: Next U/S Thyroid bed within next year. \par            TFTs and thyroglobulin today\par            ROV 6 months. \par            .\par            ==\par            .\par            October 25, 2017\par            .\par            PCP: Dr. Job Leon c/o Jamie p [phone]\par             f [phone]\par             (spec: Pulmonary, CCM, Sleep) ~1/2016\par             will see soon, ha flu shot\par            . \par             GI: Now Dr. Thai Ya p [phone] at\par             [address] [facility] [address]\par             Fax: [phone]\par            .\par             Hematologist: Deuce Darling p  fax [phone]\par             [address] [facility]\par             next ~ 12/2017\par            .\par             ENT: Dr. Luu ([place]) for nasal papilloma\par             also saw ENT [facility] but b/o advice they can come\par             back, he decided not to treat. 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 - Left thyroid lobectomy - multifocal papillary thyroid cancer: "Mass in the left lobe of the thyroid - 2 cm firm well circumscribed nodule within a 3.3 cm fleshy maroon nodule. The dominant tumor mass microscopically a papillary carcinoma. Within the tumor itself, vascular invasion identified. Along the perimeter of the main tumor, multiple microscopic satellite nodules of papillary carcinoma, several of the foci closely approached the external thyroid capsule.....in addition to the main tumor, there were at least three separate nodules in the left lobe ranging in size from 0.1 to 1.0 cm and there was tumor invoving the right lobe. At least ten separate foci fo papillary carcinoma up to 0.4 cm...close to a total thyroidectomy ..." as noted in [facility] pathology report. \par            .\par            Most recent studies:\par            6/2015 - Sonogram - [facility] - several benign appearing lymph nodes in both carotid chains that are well circumscribed with echogenic jordy, larges 2.7 cm...(Dr. William Rivera) \par             6/2015 - Bone density: Spine +2.3, L hip T +0.8, forearm +1.7\par             L femoral neck T -0.4 \par            .\par            Currently taking levothyroxine 500: 300 plus 200 mcg daily. \par            Taking vitamin D 50,000 weekly, but he forgets and probably takes\par            about 3 pills a month.\par            . \par            Still has diarrhea; however, he believes this is related to resection of intestine rather than any flare. 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 TSH has been kept low and bone density is in good range. Serial ultrasound and thyroglobulin levels have been very reassuring. He has also required vitamin D for previously low levels, and has done well on supplementation. \par            .\par            Plan: Updated lab tests today. Results will be mailed to Mr. Fitzgerald and faxed to the other members of his healthcare team. \par            . \par            Return in about six months and follow up ultrasound of neck before that.

## 2022-11-22 LAB
25(OH)D3 SERPL-MCNC: 48 NG/ML
ALBUMIN SERPL ELPH-MCNC: 4 G/DL
ALP BLD-CCNC: 69 U/L
ALT SERPL-CCNC: 18 U/L
ANION GAP SERPL CALC-SCNC: 12 MMOL/L
AST SERPL-CCNC: 19 U/L
BASOPHILS # BLD AUTO: 0.04 K/UL
BASOPHILS NFR BLD AUTO: 0.6 %
BILIRUB DIRECT SERPL-MCNC: 0.1 MG/DL
BILIRUB INDIRECT SERPL-MCNC: 0.2 MG/DL
BILIRUB SERPL-MCNC: 0.4 MG/DL
BUN SERPL-MCNC: 11 MG/DL
CALCIUM SERPL-MCNC: 8.9 MG/DL
CHLORIDE SERPL-SCNC: 106 MMOL/L
CHOLEST SERPL-MCNC: 116 MG/DL
CO2 SERPL-SCNC: 26 MMOL/L
CREAT SERPL-MCNC: 1 MG/DL
EGFR: 79 ML/MIN/1.73M2
EOSINOPHIL # BLD AUTO: 0.19 K/UL
EOSINOPHIL NFR BLD AUTO: 2.7 %
ESTIMATED AVERAGE GLUCOSE: 105 MG/DL
GLUCOSE SERPL-MCNC: 78 MG/DL
HBA1C MFR BLD HPLC: 5.3 %
HCT VFR BLD CALC: 43.4 %
HDLC SERPL-MCNC: 46 MG/DL
HGB BLD-MCNC: 14.3 G/DL
IMM GRANULOCYTES NFR BLD AUTO: 0.6 %
LDLC SERPL CALC-MCNC: 43 MG/DL
LYMPHOCYTES # BLD AUTO: 2.71 K/UL
LYMPHOCYTES NFR BLD AUTO: 39 %
MAN DIFF?: NORMAL
MCHC RBC-ENTMCNC: 31.1 PG
MCHC RBC-ENTMCNC: 32.9 GM/DL
MCV RBC AUTO: 94.3 FL
MONOCYTES # BLD AUTO: 0.54 K/UL
MONOCYTES NFR BLD AUTO: 7.8 %
NEUTROPHILS # BLD AUTO: 3.43 K/UL
NEUTROPHILS NFR BLD AUTO: 49.3 %
NONHDLC SERPL-MCNC: 70 MG/DL
PLATELET # BLD AUTO: 197 K/UL
POTASSIUM SERPL-SCNC: 4.1 MMOL/L
PROT SERPL-MCNC: 6.5 G/DL
PSA FREE FLD-MCNC: 20 %
PSA FREE SERPL-MCNC: 0.38 NG/ML
PSA SERPL-MCNC: 1.89 NG/ML
RBC # BLD: 4.6 M/UL
RBC # FLD: 12.8 %
SODIUM SERPL-SCNC: 144 MMOL/L
T3 SERPL-MCNC: 87 NG/DL
T4 SERPL-MCNC: 10.7 UG/DL
THYROGLOB AB SERPL IA-ACNC: <1.8 IU/ML
TRIGL SERPL-MCNC: 137 MG/DL
TSH SERPL-ACNC: 0.11 UIU/ML
WBC # FLD AUTO: 6.95 K/UL

## 2023-05-02 ENCOUNTER — RESULT REVIEW (OUTPATIENT)
Age: 73
End: 2023-05-02

## 2023-05-05 ENCOUNTER — APPOINTMENT (OUTPATIENT)
Dept: ENDOCRINOLOGY | Facility: CLINIC | Age: 73
End: 2023-05-05
Payer: MEDICARE

## 2023-05-05 VITALS
SYSTOLIC BLOOD PRESSURE: 116 MMHG | DIASTOLIC BLOOD PRESSURE: 74 MMHG | HEIGHT: 73 IN | BODY MASS INDEX: 26.51 KG/M2 | OXYGEN SATURATION: 97 % | WEIGHT: 200 LBS | HEART RATE: 81 BPM

## 2023-05-05 DIAGNOSIS — E53.8 DEFICIENCY OF OTHER SPECIFIED B GROUP VITAMINS: ICD-10-CM

## 2023-05-05 DIAGNOSIS — R97.20 ELEVATED PROSTATE, SPECIFIC ANTIGEN [PSA]: ICD-10-CM

## 2023-05-05 PROCEDURE — 99214 OFFICE O/P EST MOD 30 MIN: CPT

## 2023-05-08 LAB
BASOPHILS # BLD AUTO: 0.05 K/UL
BASOPHILS NFR BLD AUTO: 0.8 %
EOSINOPHIL # BLD AUTO: 0.2 K/UL
EOSINOPHIL NFR BLD AUTO: 3 %
FERRITIN SERPL-MCNC: 126 NG/ML
FOLATE SERPL-MCNC: 17.9 NG/ML
HCT VFR BLD CALC: 42.2 %
HGB BLD-MCNC: 13.8 G/DL
IMM GRANULOCYTES NFR BLD AUTO: 0.2 %
IRON SATN MFR SERPL: 33 %
IRON SERPL-MCNC: 86 UG/DL
LYMPHOCYTES # BLD AUTO: 2.52 K/UL
LYMPHOCYTES NFR BLD AUTO: 38.2 %
MAN DIFF?: NORMAL
MCHC RBC-ENTMCNC: 31.7 PG
MCHC RBC-ENTMCNC: 32.7 GM/DL
MCV RBC AUTO: 97 FL
MONOCYTES # BLD AUTO: 0.54 K/UL
MONOCYTES NFR BLD AUTO: 8.2 %
NEUTROPHILS # BLD AUTO: 3.27 K/UL
NEUTROPHILS NFR BLD AUTO: 49.6 %
PLATELET # BLD AUTO: 205 K/UL
PSA SERPL-MCNC: 1.66 NG/ML
RBC # BLD: 4.35 M/UL
RBC # BLD: 4.35 M/UL
RBC # FLD: 13 %
RETICS # AUTO: 1.8 %
RETICS AGGREG/RBC NFR: 78.3 K/UL
TIBC SERPL-MCNC: 265 UG/DL
UIBC SERPL-MCNC: 179 UG/DL
VIT B12 SERPL-MCNC: 388 PG/ML
WBC # FLD AUTO: 6.59 K/UL

## 2023-05-08 NOTE — HISTORY OF PRESENT ILLNESS
[FreeTextEntry1] : May 05, 2023     in person\par \par PCP: Dr. Job Leon c/o [facility] p [phone]   f [phone]\par             (spec: Pulmonary, CCM, Sleep) ~1/2016\par             will see soon, ha flu shot\par            . \par             GI: Now Dr. Thai Ya p [phone] at   [address] [place] [address] Dr Smith-1    Fax: [phone] (also his wife's doctor)\par            .\par             Hematologist:   Dr. 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   Had blood tests at [facility]  on 5/2/2023:\par Sees hematologist, Dr. Antonio, at [facility] regularly.\par Recent labs show Hct 39.   \par \par Currently taking levothyroxine 300 + 200 x 5 days   and the one day of 200 and\par one day of 1/2 of a 300    \par \par \par \par : :\par Constitutional:  Alert, well nourished, healthy appearance, no acute distress \par Eyes:  No proptosis, no stare\par Thyroid:\par Pulmonary:  No respiratory distress, no accessory muscle use; normal rate and effort\par Cardiac:  Normal rate\par Vascular: \par Endocrine:  No stigmata of Cushing’s Syndrome\par Musculoskeletal:  Normal gait, no involuntary movements\par Neurology:  No tremors\par Affect/Mood/Psych:  Oriented x 3; normal affect, normal insight/judgement, normal mood \par .\par \par Plan:  Lower the weekend to 150 both days.\par Continue vit D2  50,000 twice a week.   \par \par \par Nov 04, 2022      in person\par \par PCP: Dr. Job Leon c/o [facility] p [phone]   f [phone]\par             (spec: Pulmonary, CCM, Sleep) ~1/2016\par             will see soon, ha flu shot\par            . \par             GI: Now Dr. Thai Ya p [phone] at   [address] [place] [address] Dr Smith-1    Fax: [phone] (also his wife's doctor)\par            .\par             Hematologist:   Dr. 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   "VFA" also WNL.\par Has not had recent blood tests.\par \par : :\par Constitutional:  Alert, well nourished, healthy appearance, no acute distress \par Eyes:  No proptosis, no stare\par Thyroid:\par Pulmonary:  No respiratory distress, no accessory muscle use; normal rate and effort\par Cardiac:  Normal rate\par Vascular: \par Endocrine:  No stigmata of Cushing’s Syndrome\par Musculoskeletal:  Normal gait, no involuntary movements\par Neurology:  No tremors\par Affect/Mood/Psych:  Oriented x 3; normal affect, normal insight/judgement, normal mood \par .\par \par Impression:  Time for updated labs       \par CT chest shows 2-4 mm nodules  and gallstones.\par \par Plan:  ROV six months.  \par \par \par \par May 04, 2022     in person\par \par PCP: Dr. Job Leon c/o [facility] p [phone]   f [phone]\par             (spec: Pulmonary, CCM, Sleep) ~1/2016\par             will see soon, ha flu shot\par            . \par             GI: Now Dr. Thai Ya p [phone] at   [address] [facility] Dr Mack1    Fax: [phone] (also his wife's doctor)\par            .\par             Hematologist:   Dr. 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 ([facility])\par             I-131 Rx\par \par Recent US neck at [facility] 10/2021:  negative\par 500 mcg x 5 days a week\par 300 mcg x 1 daay and\par 200 mcg x 1 day\par \par : :\par Constitutional:  Alert, well nourished, healthy appearance, no acute distress \par Eyes:  No proptosis, no stare\par Thyroid:  no palpable tissue \par Pulmonary:  No respiratory distress, no accessory muscle use; normal rate and effort\par Cardiac:  Normal rate\par Vascular: \par Endocrine:  No stigmata of Cushing’s Syndrome\par Musculoskeletal:  Normal gait, no involuntary movements\par Neurology:  No tremors\par Affect/Mood/Psych:  Oriented x 3; normal affect, normal insight/judgement, normal mood \par .\par \par Impresion doing very well \par Plan:  Same Rx.  -jh\par \par \par Nov 10, 2021    in person\par \par PCP: Dr. Job Leon c/o [facility] p [phone]   f [phone]\par             (spec: Pulmonary, CCM, Sleep) ~1/2016\par             will see soon, ha flu shot\par            . \par             GI: Now Dr. Thai Ya p [phone] at   [address] [facility] Dr Saenz L-1    Fax: [phone] (also his wife's doctor)\par            .\par             Hematologist:   Dr. 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  TSH may be a tad lower than needed, but because of GI issues, challenging to\par get just at lower limits of normal.\par \par Plan:  Same Rx.\par \par \par May 12, 2021    in person\par \par PCP: Dr. Job Leon c/o [facility] p [phone]   f [phone]\par             (spec: Pulmonary, CCM, Sleep) ~1/2016\par             will see soon, ha flu shot\par            . \par             GI: Now Dr. Thai Ya p [phone] at   [address] [facility] [address] Mack[address]    Fax: [phone] (also his wife's doctor)\par            .\par             Hematologist: Deuce Darling p  fax [phone]->   Dr. 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 ([facility])\par             I-131 Rx\par \par \par Recent labs at [facility] on May 5 included \par WBC   6.87\par Hct 41.4 \par PSA 1.27\par B12 437    on monthly injection \par TSH 0.02\par T4 10.8  on levothyroxine 300 + 200 mcg daily:  Sun 200, Mon 500, T 400, Wed 500, Th 500, F 500 S 500 \par                           will lower by another 100:  Th will be 400 instead of 500\par 25 OH vit D 37    on 50,000 iu BIW \par LDL 46\par HDL 40 \par glucose 82\par calcium 9.0\par creatinine 1.05\par thyroglobulin tumor marker \par \par Imp:  TSH suppressed, will lower dose of thyroxine by another 100 mcg/week.\par Updated labs before visit in six months and US neck.  \par \par \par \par \par Nov 11, 2020     \par \par PCP: Dr. Job Leon c/o [facility] p [phone]   f [phone]\par             (spec: Pulmonary, CCM, Sleep) ~1/2016\par             will see soon, ha flu shot\par            . \par             GI: Now Dr. 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 ([facility])\par             I-131 Rx\par \par US thyroid AOK\par thyroblobulin undetectable.\par TSH suppressed.\par Possibly has a short fuse or just stress of \par \par Taking LT4 300 plus 200 x 6 days and 200 x 1 day.\par So can decrease the 200 one day to 100 that day.\par \par Examination: \par Constitutional:  Alert, well nourished, healthy appearance, no acute distress \par Eyes:  No proptosis, no lid lag\par Thyroid:\par Pulmonary:  No respiratory distress, no accessory muscle use; normal rate and effort\par Cardiac:  Normal rate\par Vascular: \par Endocrine:  No stigmata of Cushing’s Syndrome\par Musculoskeletal:  Normal gait, no involuntary movements\par Neurology:  No tremors\par Affect/Mood/Psych:  Oriented x 3; normal affect, normal insight/judgement, normal mood \par .\par  Impression:  Still follows with Hematology.    \par TSH of 0.01 may be lower than necessary even though bone density in 2015 was very good\par     at [facility] with T scroes LS + 2.3,  TH + 0.8       forearm T + 1.7\par Thyroglobulin and thyroglobulin antibody remains negative.\par \par Plan:  the one day a week where he takes only 200 mcg, he can decrease to 100 mcg.    \par \par \par \par \par \par Oct 14, 2019\par \par  PCP: Dr. Job Leon c/o [facility] p [phone]\par             f [phone]\par             (spec: Pulmonary, CCM, Sleep) ~1/2016\par             will see soon, ha flu shot\par            . \par             GI: Now Dr. Thai Ya p [phone] at\par             [address] [facility] [address] Smith-1\par             Fax: [phone] (also his wife's doctor)\par            .\par             Hematologist: Deuce Darling p  fax [phone]\par             [address] [facility]\par \par At last visit, TSH 0.08 T3 100, T4 11.3 while taking average daily dose of 457 mcg of levothyroxine (highest he ever required was\par ~ 600 mcg/d b/o Crohns and shorter bowel).  The 457 was generated by taking 200 mcg x 7 days and 300 mcg x 6 days,\par so we will consider 200 + 200 + 25 mcg \par \par \par Plans:   TFTs today and\par He is concerned about his vegan wife's iodine intake.  \par Labs today and again before next visit (at [facility])\par Ultrasound neck before next visit as well.\par He is comfortable with suppressed TSH and his bone density is far above average.  \par             \par \par \par March 22, 2019\par \par   PCP: Dr. Job Leon c/o [facility] p [phone]\par             f [phone]\par             (spec: Pulmonary, CCM, Sleep) ~1/2016\par             will see soon, ha flu shot\par            . \par             GI: Now Dr. Thai Ya p [phone] at\par             [address] [facility] [address] Saenz L-1\par             Fax: [phone] (also his wife's doctor)\par            .\par             Hematologist: Deuce Darling p  fax [phone]\par             [address] [facility]\par             next ~ 12/2017\par             next July 2018 (q6 mos)\par            .\par \par Has new hematologist.  He believes Dr. Vann is at [facility].\par Dose of thyroxine now 300 mcg plus 200 mcg  but now skips the 300 once a week.\par \par Recent US neck at [facility] not changed.\par \par Plan:  Labs today.\par ROV.  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 \par             .\par \par \par \par Previous notes from [employer] appended below.\par \par \par   April 20, 2018\par    PCP: Dr. Job Leon c/o [facility] p [phone]\par             f [phone]\par             (spec: Pulmonary, CCM, Sleep) ~1/2016\par             will see soon, ha flu shot\par            . \par             GI: Now Dr. 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 \par            .\par            Taking levothyroxine 500 mgc (300 + 200 mcg) daily.\par            Has diarrhea and is on various treatments. \par            Takes low dose OTC Immodium\par            .\par            On antibiotic as he just had a dental implant. \par            Most recent 2018 ultrasound of neck/thyroid bed at [facility]:\par            .\par            CLINICAL HISTORY: Thyroid cancer \par             Thyroid Ultrasound \par             Real-time sonographic examination was performed on 4/4/2018 and compared to a previous study dated \par            9/28/2016. The patient is status post total thyroidectomy. A 2.5 x 0.5 x 1.9 cm mass with an \par            echogenic hilum is again noted within the right thyroid bed and likely to represent a lymph node. \par            It is grossly unchanged since the patient's previous study. No mass or residual thyroid tissue is \par            present within the left thyroid bed. \par             IMPRESSION: Status post total thyroidectomy. Stable lymph node within the right thyroid bed. \par            ***Electronically Signed *** \par            ----------------------------------------------- \par            Kahlil Banks MD \par            .\par            .\par            I reviewed with Mr. Fitzgerald the reports from 2015 and 2016\par            .\par            Impression: Very stable. Lymph node neck being monitored.\par            Plan: Next U/S Thyroid bed within next year. \par            TFTs and thyroglobulin today\par            ROV 6 months. \par            .\par            ==\par            .\par            October 25, 2017\par            .\par            PCP: Dr. Job Leon c/o [facility] p [phone]\par             f [phone]\par             (spec: Pulmonary, CCM, Sleep) ~1/2016\par             will see soon, ha flu shot\par            . \par             GI: Now Dr. Thai Ya p [phone] at\par             [address] [facility] [address] Saenz L-1\par             Fax: [phone]\par            .\par             Hematologist: Deuce Darling p  fax [phone]\par             [address] [place] [facility]\par             next ~ 12/2017\par            .\par             ENT: Dr. Luu ([place]) for nasal papilloma\par             also saw ENT [facility] but b/o advice they can come\par             back, he decided not to treat. 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 The dominant tumor mass microscopically a papillary carcinoma. Within the tumor itself, vascular invasion identified. Along the perimeter of the main tumor, multiple microscopic satellite nodules of papillary carcinoma, several of the foci closely approached the external thyroid capsule.....in addition to the main tumor, there were at least three separate nodules in the left lobe ranging in size from 0.1 to 1.0 cm and there was tumor invoving the right lobe. At least ten separate foci fo papillary carcinoma up to 0.4 cm...close to a total thyroidectomy ..." as noted in [facility] pathology report. \par            .\par            Most recent studies:\par            6/2015 - Sonogram - [facility] - several benign appearing lymph nodes in both carotid chains that are well circumscribed with echogenic jordy, larges 2.7 cm...(Dr. William Rivera) \par             6/2015 - Bone density: Spine +2.3, L hip T +0.8, forearm +1.7\par             L femoral neck T -0.4 \par            .\par            Currently taking levothyroxine 500: 300 plus 200 mcg daily. \par            Taking vitamin D 50,000 weekly, but he forgets and probably takes\par            about 3 pills a month.\par            . \par            Still has diarrhea; however, he believes this is related to resection of intestine rather than any flare. 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 TSH has been kept low and bone density is in good range. Serial ultrasound and thyroglobulin levels have been very reassuring. He has also required vitamin D for previously low levels, and has done well on supplementation. \par            .\par            Plan: Updated lab tests today. Results will be mailed to  [facility] and faxed to the other members of his healthcare team. \par            . \par            Return in about six months and follow up ultrasound of neck before that.

## 2023-05-22 LAB
ALBUMIN MFR SERPL ELPH: 59.5 %
ALBUMIN SERPL-MCNC: 3.5 G/DL
ALBUMIN/GLOB SERPL: 1.5 RATIO
ALPHA1 GLOB MFR SERPL ELPH: 4.3 %
ALPHA1 GLOB SERPL ELPH-MCNC: 0.3 G/DL
ALPHA2 GLOB MFR SERPL ELPH: 9.1 %
ALPHA2 GLOB SERPL ELPH-MCNC: 0.5 G/DL
B-GLOBULIN MFR SERPL ELPH: 11.9 %
B-GLOBULIN SERPL ELPH-MCNC: 0.7 G/DL
GAMMA GLOB FLD ELPH-MCNC: 0.9 G/DL
GAMMA GLOB MFR SERPL ELPH: 15.2 %
INTERPRETATION SERPL IEP-IMP: NORMAL
M PROTEIN MFR SERPL ELPH: NORMAL
M PROTEIN SPEC IFE-MCNC: NORMAL
MONOCLON BAND OBS SERPL: NORMAL
PROT SERPL-MCNC: 5.9 G/DL
PROT SERPL-MCNC: 5.9 G/DL

## 2023-12-06 ENCOUNTER — APPOINTMENT (OUTPATIENT)
Dept: ENDOCRINOLOGY | Facility: CLINIC | Age: 73
End: 2023-12-06
Payer: MEDICARE

## 2023-12-06 ENCOUNTER — LABORATORY RESULT (OUTPATIENT)
Age: 73
End: 2023-12-06

## 2023-12-06 VITALS
DIASTOLIC BLOOD PRESSURE: 72 MMHG | OXYGEN SATURATION: 98 % | WEIGHT: 190 LBS | BODY MASS INDEX: 25.18 KG/M2 | HEART RATE: 60 BPM | SYSTOLIC BLOOD PRESSURE: 118 MMHG | HEIGHT: 73 IN

## 2023-12-06 DIAGNOSIS — Z85.46 PERSONAL HISTORY OF MALIGNANT NEOPLASM OF PROSTATE: ICD-10-CM

## 2023-12-06 PROCEDURE — 36415 COLL VENOUS BLD VENIPUNCTURE: CPT

## 2023-12-06 PROCEDURE — 99214 OFFICE O/P EST MOD 30 MIN: CPT | Mod: 25

## 2023-12-10 LAB
25(OH)D3 SERPL-MCNC: 53.8 NG/ML
ALBUMIN SERPL ELPH-MCNC: 4 G/DL
ALP BLD-CCNC: 65 U/L
ALT SERPL-CCNC: 10 U/L
ANION GAP SERPL CALC-SCNC: 11 MMOL/L
AST SERPL-CCNC: 16 U/L
BILIRUB DIRECT SERPL-MCNC: 0.1 MG/DL
BILIRUB INDIRECT SERPL-MCNC: 0.2 MG/DL
BILIRUB SERPL-MCNC: 0.4 MG/DL
BUN SERPL-MCNC: 12 MG/DL
CALCIUM SERPL-MCNC: 9.1 MG/DL
CHLORIDE SERPL-SCNC: 105 MMOL/L
CHOLEST SERPL-MCNC: 111 MG/DL
CO2 SERPL-SCNC: 27 MMOL/L
CREAT SERPL-MCNC: 0.91 MG/DL
EGFR: 88 ML/MIN/1.73M2
GLUCOSE SERPL-MCNC: 87 MG/DL
HCT VFR BLD CALC: 40 %
HDLC SERPL-MCNC: 49 MG/DL
HGB BLD-MCNC: 13.2 G/DL
LDLC SERPL CALC-MCNC: 40 MG/DL
MCHC RBC-ENTMCNC: 30.4 PG
MCHC RBC-ENTMCNC: 33 GM/DL
MCV RBC AUTO: 92.2 FL
NONHDLC SERPL-MCNC: 62 MG/DL
PLATELET # BLD AUTO: 204 K/UL
POTASSIUM SERPL-SCNC: 4.5 MMOL/L
PROT SERPL-MCNC: 6.4 G/DL
PSA SERPL-MCNC: 1.53 NG/ML
RBC # BLD: 4.34 M/UL
RBC # FLD: 12.8 %
SODIUM SERPL-SCNC: 143 MMOL/L
T3 SERPL-MCNC: 86 NG/DL
T4 SERPL-MCNC: 11.2 UG/DL
THYROGLOB AB SERPL IA-ACNC: <1.8 IU/ML
TRIGL SERPL-MCNC: 123 MG/DL
TSH SERPL-ACNC: 0.03 UIU/ML
VIT B12 SERPL-MCNC: 493 PG/ML
WBC # FLD AUTO: 7.09 K/UL

## 2024-02-28 ENCOUNTER — RESULT REVIEW (OUTPATIENT)
Age: 74
End: 2024-02-28

## 2024-03-05 ENCOUNTER — APPOINTMENT (OUTPATIENT)
Dept: ENDOCRINOLOGY | Facility: CLINIC | Age: 74
End: 2024-03-05
Payer: MEDICARE

## 2024-03-05 VITALS
DIASTOLIC BLOOD PRESSURE: 74 MMHG | BODY MASS INDEX: 25.31 KG/M2 | SYSTOLIC BLOOD PRESSURE: 116 MMHG | HEIGHT: 73 IN | OXYGEN SATURATION: 97 % | WEIGHT: 191 LBS | HEART RATE: 81 BPM

## 2024-03-05 PROCEDURE — 99214 OFFICE O/P EST MOD 30 MIN: CPT

## 2024-03-05 NOTE — HISTORY OF PRESENT ILLNESS
[FreeTextEntry1] : Mar 05, 2024    in person  PCP: Dr. Job Leon c/o [facility] p [phone]   f [phone]             (spec: Pulmonary, CCM, Sleep) ~1/2016             will see soon, had flu shot            .              GI: Hx Crohn's Dis - Now Dr. Thai Ya p [phone] at   [address] [place] [address] Dr Smith-1    Fax: [phone] (also his                             wife's doctor)            .             Hematologist:   Dr. 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   Had blood tests at [facility]  on 5/2/2023: Sees hematologist, Dr. Antonio, at - regularly.  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     Currently taking levothyroxine [300 + 200] x 5 days   and the one day of 200 and one day of 1/2 of a 300      Had a colonoscopy and endoscopy followed by fever in August 2023 - [facility] - ER - admitted for a few days and treated with antibiotics.  Currently taking levothyroxine 500 mcg x 5 days, 200 mg x 1 day and 150 mcg x 1 day. Recent TSH 2/28/24 still completely suppressed (dec Tg undetectable). Plan:  Lower LT4 to 500 mcg x 4 days, 200 mcg x 2 days and 150 mcg x 1 day and repeat TFTs, Tg before next visit.   He will share labs, CBC with Hematology as well US neck report reviewed.   Undetectable Tg and US neck report reassuring.  .        Dec 06, 2023      in person  PCP: Dr. Job Leon c/o [facility] p [phone]   f [phone]             (spec: Pulmonary, CCM, Sleep) ~1/2016             will see soon, ha flu shot            .              GI: Now Dr. Thai Ya p [phone] at   [address] [place] [address] Dr Smith-1    Fax: [phone] (also his wife's doctor)            .             Hematologist:   Dr. 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   Had blood tests at [facility]  on 5/2/2023: Sees hematologist, Dr. Antonio, at [facility]- regularly.  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     Currently taking levothyroxine [300 + 200] x 5 days   and the one day of 200 and one day of 1/2 of a 300      [facility] - admitted for a few days and treated with antibiotics.  : : Constitutional:  Alert, well nourished, healthy appearance, no acute distress  Eyes:  No proptosis, no stare Thyroid: Pulmonary:  No respiratory distress, no accessory muscle use; normal rate and effort Cardiac:  Normal rate Vascular:  Endocrine:  No stigmata of Cushings Syndrome Musculoskeletal:  Normal gait, no involuntary movements Neurology:  No tremors Affect/Mood/Psych:  Oriented x 3; normal affect, normal insight/judgement, normal mood  . Impression:  No evidence for return of thyroid cancer.  Plan:  Updated labs today including TFTs, Tg, CBC/diff.     ROV in September -jh    May 05, 2023     in person  PCP: Dr. Job Leon c/o [facility] p [phone]   f [phone]             (spec: Pulmonary, CCM, Sleep) ~1/2016             will see soon, ha flu shot            .              GI: Now Dr. Thai Ya p [phone] at   [address] [place] [address] Dr Smith-1    Fax: [phone] (also his wife's doctor)            .             Hematologist:   Dr. 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   Had blood tests at [facility]  on 5/2/2023: Sees hematologist, Dr. Antonio, at [facility]- regularly. Recent labs show Hct 39.     Currently taking levothyroxine 300 + 200 x 5 days   and the one day of 200 and one day of 1/2 of a 300        : : Constitutional:  Alert, well nourished, healthy appearance, no acute distress  Eyes:  No proptosis, no stare Thyroid: Pulmonary:  No respiratory distress, no accessory muscle use; normal rate and effort Cardiac:  Normal rate Vascular:  Endocrine:  No stigmata of Cushing's Syndrome Musculoskeletal:  Normal gait, no involuntary movements Neurology:  No tremors Affect/Mood/Psych:  Oriented x 3; normal affect, normal insight/judgement, normal mood  .  Plan:  Lower the weekend to 150 both days. Continue vit D2  50,000 twice a week.      Nov 04, 2022      in person  PCP: Dr. Job Leon c/[facility] p [phone]   f [phone]             (spec: Pulmonary, CCM, Sleep) ~1/2016             will see soon, ha flu shot            .              GI: Now Dr. Thai Ya p [phone] at   [address] [place] [address] Dr Saenz L-1    Fax: [phone] (also his wife's doctor)            .             Hematologist:   Dr. 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   "VFA" also WNL. Has not had recent blood tests.  : : Constitutional:  Alert, well nourished, healthy appearance, no acute distress  Eyes:  No proptosis, no stare Thyroid: Pulmonary:  No respiratory distress, no accessory muscle use; normal rate and effort Cardiac:  Normal rate Vascular:  Endocrine:  No stigmata of Cushing's Syndrome Musculoskeletal:  Normal gait, no involuntary movements Neurology:  No tremors Affect/Mood/Psych:  Oriented x 3; normal affect, normal insight/judgement, normal mood  .  Impression:  Time for updated labs        CT chest shows 2-4 mm nodules  and gallstones.  Plan:  ROV six months.      May 04, 2022     in person  PCP: Dr. Job Leon c/[facility] p [phone]   f [phone]             (spec: Pulmonary, CCM, Sleep) ~1/2016             will see soon, ha flu shot            .              GI: Now Dr. Thai Ya p [phone] at   [address] [place] [address] Dr Smith-1    Fax: [phone] (also his wife's doctor)            .             Hematologist:   Dr. 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 ([facility])             I-131 Rx  Recent US neck at [facility] 10/2021:  negative 500 mcg x 5 days a week 300 mcg x 1 daay and 200 mcg x 1 day  : : Constitutional:  Alert, well nourished, healthy appearance, no acute distress  Eyes:  No proptosis, no stare Thyroid:  no palpable tissue  Pulmonary:  No respiratory distress, no accessory muscle use; normal rate and effort Cardiac:  Normal rate Vascular:  Endocrine:  No stigmata of Cushing's Syndrome Musculoskeletal:  Normal gait, no involuntary movements Neurology:  No tremors Affect/Mood/Psych:  Oriented x 3; normal affect, normal insight/judgement, normal mood  .  Impresion doing very well  Plan:  Same Rx.  -   Nov 10, 2021    in person  PCP: Dr. Job Leon c/[facility] p [phone]   f [phone]             (spec: Pulmonary, CCM, Sleep) ~1/2016             will see soon, ha flu shot            .              GI: Now Dr. Thai Ya p [phone] at   [address] [place] [address] Dr Smith-1    Fax: [phone] (also his wife's doctor)            .             Hematologist:   Dr. 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 TSH is suppressed. Has Crohn's and short bowel. Getting TSH just right challenging. Max dose of levothyroxine was 600 mcg daily and he is now down to 400   Impression:  Hypothyroidism well controlled.  TSH may be a tad lower than needed, but because of GI issues, challenging to get just at lower limits of normal.  Plan:  Same Rx.   May 12, 2021    in person  PCP: Dr. Job Leon c/[facility] p [phone]   f [phone]             (spec: Pulmonary, CCM, Sleep) ~1/2016             will see soon, ha flu shot            .              GI: Now Dr. Thai Ya p [phone] at   [address] [place] [address] Dr Mack1    Fax: [phone] (also his wife's doctor)            .             Hematologist: Deuce Darling p  fax [phone]->   Dr. 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 Updated labs before visit in six months and US neck.       Nov 11, 2020       PCP: Dr. Job Leon c/o [facility] p [phone]   f [phone]             (spec: Pulmonary, CCM, Sleep) ~1/2016             will see soon, ha flu shot            .              GI: Now Dr. Thai Ya p [phone] at             [address] [place] [address] Dr Mack1             Fax: [phone] (also his wife's doctor)            .             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 TSH suppressed. Possibly has a short fuse or just stress of   Taking LT4 300 plus 200 x 6 days and 200 x 1 day. So can decrease the 200 one day to 100 that day.  Examination:  Constitutional:  Alert, well nourished, healthy appearance, no acute distress  Eyes:  No proptosis, no lid lag Thyroid: Pulmonary:  No respiratory distress, no accessory muscle use; normal rate and effort Cardiac:  Normal rate Vascular:  Endocrine:  No stigmata of Cushing's Syndrome Musculoskeletal:  Normal gait, no involuntary movements Neurology:  No tremors Affect/Mood/Psych:  Oriented x 3; normal affect, normal insight/judgement, normal mood  .  Impression:  Still follows with Hematology.     TSH of 0.01 may be lower than necessary even though bone density in 2015 was very good     at [facility] with T scroes LS + 2.3,  TH + 0.8       forearm T + 1.7 Thyroglobulin and thyroglobulin antibody remains negative.  Plan:  the one day a week where he takes only 200 mcg, he can decrease to 100 mcg.          Oct 14, 2019   PC[facility]: [facility]. [facility] p [phone]             f [phone]             (spec: Pulmonary, CCM, Sleep) ~1/2016             will see soon, ha flu shot            .              GI: Now Dr. Thai Ya p [phone] at             [address] [place] [address] Dr Mack1             Fax: [phone] (also his wife's doctor)            .             Hematologist: Deuce Darling p  fax [phone] [address] [facility]  At last visit, TSH 0.08 T3 100, T4 11.3 while taking average daily dose of 457 mcg of levothyroxine (highest he ever required was ~ 600 mcg/d b/o Crohns and shorter bowel).  The 457 was generated by taking 200 mcg x 7 days and 300 mcg x 6 days, so we will consider 200 + 200 + 25 mcg    Plans:   TFTs today and He is concerned about his vegan wife's iodine intake.   Labs today and again before next visit (at [facility]) Ultrasound neck before next visit as well. He is comfortable with suppressed TSH and his bone density is far above average.                  March 22, 2019    PCP: Dr. Job Leon c/o [facility] p [phone]             f [phone]             (spec: Pulmonary, CCM, Sleep) ~1/2016             will see soon, ha flu shot            .              GI: Now Dr. Thai Ya p [phone] at             [address] [place] [address] Dr Mack1             Fax: [phone] (also his wife's doctor)            .             Hematologist: Deuce Darling p  fax [phone] [address] [facility]             next ~ 12/2017             next July 2018 (q6 mos)            .  Has new hematologist.  He believes Dr. Vann is at [facility]. Dose of thyroxine now 300 mcg plus 200 mcg  but now skips the 300 once a week.  Recent US neck at [facility] not changed.  Plan:  Labs today. ROV.               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              .    Previous notes from [employer] appended below.     April 20, 2018    PCP: Dr. Job Leon c/o [facility] p [phone]             f [phone]             (spec: Pulmonary, CCM, Sleep) ~1/2016             will see soon, ha flu shot            .              GI: Now Dr. Thai Ya p [phone] at             [address] [place] [address] Dr Smith-1             Fax: [phone] (also his wife's doctor)            .             Hematologist: Deuce Darling p  fax [phone]             [address] [facility]             next ~ 12/2017             next July 2018 (q6 mos)            .             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              .             ..             .             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            68 yo - feels well, has some fatigue.            Taking levothyroxine religiously.             .            Taking levothyroxine 500 mgc (300 + 200 mcg) daily.            Has diarrhea and is on various treatments.             Takes low dose OTC Immodium            .            On antibiotic as he just had a dental implant.             Most recent 2018 ultrasound of neck/thyroid bed at [facility]:            .            CLINICAL HISTORY: Thyroid cancer              Thyroid Ultrasound              Real-time sonographic examination was performed on 4/4/2018 and compared to a previous study dated             9/28/2016. The patient is status post total thyroidectomy. A 2.5 x 0.5 x 1.9 cm mass with an             echogenic hilum is again noted within the right thyroid bed and likely to represent a lymph node.             It is grossly unchanged since the patient's previous study. No mass or residual thyroid tissue is             present within the left thyroid bed.              IMPRESSION: Status post total thyroidectomy. Stable lymph node within the right thyroid bed.             ***Electronically Signed ***             -----------------------------------------------             Kahlil Banks MD             .            .            I reviewed with Mr. Fitzgerald the reports from 2015 and 2016            .            Impression: Very stable. Lymph node neck being monitored.            Plan: Next U/S Thyroid bed within next year.             TFTs and thyroglobulin today            ROV 6 months.             .            ==            .            October 25, 2017            .            PCP: Dr. Job Leon c/o [facility] p [phone]             f [phone]             (spec: Pulmonary, CCM, Sleep) ~1/2016             will see soon, ha flu shot            .              GI: Now Dr. Thai Ya p [phone] at             [address] [place] [address] Dr Smith-1             Fax: [phone]            .             Hematologist: Deuce Darling p  fax [phone]             [facility]             next ~ 12/2017            .             ENT: Dr. Luu ([place]) for nasal papilloma             also saw ENT [facility] but b/o advice they can come             back, he decided not to treat.              .             .             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             (Crohn's disease)             Vitamin D deficiency             Low platelets & platelet clumping             Preclinical "CLL" (2015: 3 % atypical lymphocytes)            .            -1992 - [facility] - Left thyroid lobectomy - multifocal papillary thyroid cancer: "Mass in the left lobe of the thyroid - 2 cm firm well circumscribed nodule within a 3.3 cm fleshy maroon nodule. The dominant tumor mass microscopically a papillary carcinoma. Within the tumor itself, vascular invasion identified. Along the perimeter of the main tumor, multiple microscopic satellite nodules of papillary carcinoma, several of the foci closely approached the external thyroid capsule.....in addition to the main tumor, there were at least three separate nodules in the left lobe ranging in size from 0.1 to 1.0 cm and there was tumor invoving the right lobe. At least ten separate foci fo papillary carcinoma up to 0.4 cm...close to a total thyroidectomy ..." as noted in [facility] pathology report.             .            Most recent studies:            6/2015 - Sonogram - [facility] - several benign appearing lymph nodes in both carotid chains that are well circumscribed with echogenic jordy, larges 2.7 cm...(Dr. William Rivera)              6/2015 - Bone density: Spine +2.3, L hip T +0.8, forearm +1.7             L femoral neck T -0.4             .            Currently taking levothyroxine 500: 300 plus 200 mcg daily.             Taking vitamin D 50,000 weekly, but he forgets and probably takes            about 3 pills a month.            .             Still has diarrhea; however, he believes this is related to resection of intestine rather than any flare.             .            Impression: The inflammatory bowel disease required surgery in the past and it is the short bowel that seems to be responsible for his requirement for 500 mcg of levothyroxine a day. TSH has been kept low and bone density is in good range. Serial ultrasound and thyroglobulin levels have been very reassuring. He has also required vitamin D for previously low levels, and has done well on supplementation.             .            Plan: Updated lab tests today. Results will be mailed to Mr. Fitzgerald and faxed to the other members of his healthcare team.             .             Return in about six months and follow up ultrasound of neck before that.

## 2024-05-07 ENCOUNTER — RESULT REVIEW (OUTPATIENT)
Age: 74
End: 2024-05-07

## 2024-05-13 ENCOUNTER — APPOINTMENT (OUTPATIENT)
Dept: ENDOCRINOLOGY | Facility: CLINIC | Age: 74
End: 2024-05-13

## 2024-05-15 ENCOUNTER — APPOINTMENT (OUTPATIENT)
Dept: ENDOCRINOLOGY | Facility: CLINIC | Age: 74
End: 2024-05-15
Payer: MEDICARE

## 2024-05-15 DIAGNOSIS — C73 MALIGNANT NEOPLASM OF THYROID GLAND: ICD-10-CM

## 2024-05-15 DIAGNOSIS — E03.9 HYPOTHYROIDISM, UNSPECIFIED: ICD-10-CM

## 2024-05-15 PROCEDURE — 99215 OFFICE O/P EST HI 40 MIN: CPT

## 2024-05-15 RX ORDER — LEVOTHYROXINE SODIUM 0.3 MG/1
300 TABLET ORAL
Qty: 90 | Refills: 3 | Status: ACTIVE | COMMUNITY
Start: 2019-02-26 | End: 1900-01-01

## 2024-05-15 RX ORDER — LEVOTHYROXINE SODIUM 0.2 MG/1
200 TABLET ORAL
Qty: 90 | Refills: 3 | Status: ACTIVE | COMMUNITY
Start: 2019-02-26 | End: 1900-01-01

## 2024-05-15 NOTE — HISTORY OF PRESENT ILLNESS
[FreeTextEntry1] : May 15, 2024    in person  PCP: Dr. Job Leon c/o [facility] p [phone]   f [phone]             (spec: Pulmonary, CCM, Sleep) ~1/2016                        .              GI: Hx Crohn's Dis - Now Dr. Thai Ya p [phone] at   [address] [place] [address] Dr Mack1    Fax: [phone] (also his                             wife's doctor)            .             Hematologist:   Dr. Darío Antonio  for pre-CLL   2/28/2024 Hct 39.5      CC: Thyroid cancer              1971 Right lobectomy: benign              1992 Left multifocal papillary thyroid cancer/vascular invasion ([facility])             I-131 Rx            2/28/2024 US neck ([facility]): no cervical adenopathy    72 yo with history of thyroid cancer (1992).   Had blood tests at [facility]  on 5/2/2023: Sees hematologist, Dr. Antonio, at [facility] regularly.  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     Had been taking levothyroxine [300 + 200] x 5 days   and the one day of 200 and one day of 1/2 of a 300    and as of 5/15/2024 taking: [300+200 x 4 days and 200 x 2 days and 1/2 of 300]   **** and as of 5/7/2024 TSH still suppressed and so will advise [300+200 x 3 days and 200 x 3 days and 1150 [=1/2 of 300) and repeat labs in a few months. f    Had a colonoscopy and endoscopy followed by fever in August 2023 - [facility] - ER - admitted for a few days and treated with antibiotics.  : : Constitutional:  Alert, well nourished, healthy appearance, no acute distress  Eyes:  No proptosis, no stare Thyroid: Pulmonary:  No respiratory distress, no accessory muscle use; normal rate and effort Cardiac:  Normal rate Vascular:  Endocrine:  No stigmata of Cushings Syndrome Musculoskeletal:  Normal gait, no involuntary movements Neurology:  No tremors Affect/Mood/Psych:  Oriented x 3; normal affect, normal insight/judgement, normal mood  .  Impression:   Room to lower levothroxine further. Plan:  As above. Updated labs prior to October visit. Next US neck 2025.     Currently taking levothyroxine 500 mcg x 5 days, 200 mg x 1 day and 150 mcg x 1 day. Recent TSH 2/28/24 still completely suppressed (dec Tg undetectable).    Mar 05, 2024    in person  PCP: Dr. Job Leon c/o [facility] p [phone]   f [phone]             (spec: Pulmonary, CCM, Sleep) ~1/2016             will see soon, had flu shot            .              GI: Hx Crohn's Dis - Now Dr. Thai Ya p [phone] at   [address] [place] [address] Dr Smith-1    Fax: [phone] (also his                             wife's doctor)            .             Hematologist:   Dr. 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   Had blood tests at [facility]  on 5/2/2023: Sees hematologist, Dr. Antonio, at [facility] regularly.  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     Currently taking levothyroxine [300 + 200] x 5 days   and the one day of 200 and one day of 1/2 of a 300      Had a colonoscopy and endoscopy followed by fever in August 2023 - [facility] - ER - admitted for a few days and treated with antibiotics.  Currently taking levothyroxine 500 mcg x 5 days, 200 mg x 1 day and 150 mcg x 1 day. Recent TSH 2/28/24 still completely suppressed (dec Tg undetectable). Plan:  Lower LT4 to 500 mcg x 4 days, 200 mcg x 2 days and 150 mcg x 1 day and repeat TFTs, Tg before next visit.   He will share labs, CBC with Hematology as well US neck report reviewed.   Undetectable Tg and US neck report reassuring.  .        Dec 06, 2023      in person  PCP: Dr. Job Leon c/o [facility] p [phone]   f [phone]             (spec: Pulmonary, CCM, Sleep) ~1/2016             will see soon, ha flu shot            .              GI: Now Dr. Thai Ya p [phone] at   [address] [place] [address] Dr Smith-1    Fax: [phone] (also his wife's doctor)            .             Hematologist:   Dr. Antonio (saw ~ 12/2022)      CC: Thyroid cancer              1971 Right lobectomy: benign              1992 Left multifocal papillary thyroid cancer/vascular invasion ([facility])             I-131 Rx  72 yo with history of thyroid cancer.   Had blood tests at [facility]  on 5/2/2023: Sees hematologist, Dr. Antonio, at [facility] regularly.  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     Currently taking levothyroxine [300 + 200] x 5 days   and the one day of 200 and one day of 1/2 of a 300      Had a colonoscopy and endoscopy followed by fever inAugust 2023 - [facility] - ER - admitted for a few days and treated with antibiotics.  : : Constitutional:  Alert, well nourished, healthy appearance, no acute distress  Eyes:  No proptosis, no stare Thyroid: Pulmonary:  No respiratory distress, no accessory muscle use; normal rate and effort Cardiac:  Normal rate Vascular:  Endocrine:  No stigmata of Cushings Syndrome Musculoskeletal:  Normal gait, no involuntary movements Neurology:  No tremors Affect/Mood/Psych:  Oriented x 3; normal affect, normal insight/judgement, normal mood  . Impression:  No evidence for return of thyroid cancer.  Plan:  Updated labs today including TFTs, Tg, CBC/diff.     ROV in September -jh    May 05, 2023     in person  PCP: Dr. Job Leon c/o [facility] p [phone]   f [phone]             (spec: Pulmonary, CCM, Sleep) ~1/2016             will see soon, ha flu shot            .              GI: Now Dr. Thai Ya p [phone] at   [address] [place] [address] Dr Smith-1    Fax: [phone] (also his wife's doctor)            .             Hematologist:   Dr. Antonio (saw ~ 12/2022)      CC: Thyroid cancer              1971 Right lobectomy: benign              1992 Left multifocal papillary thyroid cancer/vascular invasion ([facility])             I-131 Rx  73 yo with history of thyroid cancer.   Had blood tests at [facility] [place]  on 5/2/2023: Sees hematologist, Dr. Antonio, at [facility] regularly. Recent labs show Hct 39.     Currently taking levothyroxine 300 + 200 x 5 days   and the one day of 200 and one day of 1/2 of a 300        : : Constitutional:  Alert, well nourished, healthy appearance, no acute distress  Eyes:  No proptosis, no stare Thyroid: Pulmonary:  No respiratory distress, no accessory muscle use; normal rate and effort Cardiac:  Normal rate Vascular:  Endocrine:  No stigmata of Cushing's Syndrome Musculoskeletal:  Normal gait, no involuntary movements Neurology:  No tremors Affect/Mood/Psych:  Oriented x 3; normal affect, normal insight/judgement, normal mood  .  Plan:  Lower the weekend to 150 both days. Continue vit D2  50,000 twice a week.      Nov 04, 2022      in person  PCP: Dr. Job Leon c/o [facility] p [phone]   f [phone]             (spec: Pulmonary, CCM, Sleep) ~1/2016             will see soon, ha flu shot            .              GI: Now Dr. Thai Ya p [phone] at   [address] [place] [address] Dr Saenz L-1    Fax: [phone] (also his wife's doctor)            .             Hematologist:   Dr. 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   "VFA" also WNL. Has not had recent blood tests.  : : Constitutional:  Alert, well nourished, healthy appearance, no acute distress  Eyes:  No proptosis, no stare Thyroid: Pulmonary:  No respiratory distress, no accessory muscle use; normal rate and effort Cardiac:  Normal rate Vascular:  Endocrine:  No stigmata of Cushing's Syndrome Musculoskeletal:  Normal gait, no involuntary movements Neurology:  No tremors Affect/Mood/Psych:  Oriented x 3; normal affect, normal insight/judgement, normal mood  .  Impression:  Time for updated labs        CT chest shows 2-4 mm nodules  and gallstones.  Plan:  ROV six months.      May 04, 2022     in person  PCP: Dr. Job Leon c/o [facility] p [phone]   f [phone]             (spec: Pulmonary, CCM, Sleep) ~1/2016             will see soon, ha flu shot            .              GI: Now Dr. Thai Ya p [phone] at   [address] [place] [address] Dr Smith-1    Fax: [phone] (also his wife's doctor)            .             Hematologist:   Dr. 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 ([facility])             I-131 Rx  Recent US neck at [facility] [place] 10/2021:  negative 500 mcg x 5 days a week 300 mcg x 1 daay and 200 mcg x 1 day  : : Constitutional:  Alert, well nourished, healthy appearance, no acute distress  Eyes:  No proptosis, no stare Thyroid:  no palpable tissue  Pulmonary:  No respiratory distress, no accessory muscle use; normal rate and effort Cardiac:  Normal rate Vascular:  Endocrine:  No stigmata of Cushing's Syndrome Musculoskeletal:  Normal gait, no involuntary movements Neurology:  No tremors Affect/Mood/Psych:  Oriented x 3; normal affect, normal insight/judgement, normal mood  .  Impresion doing very well  Plan:  Same Rx.  -   Nov 10, 2021    in person  PCP: Dr. Job Leon c/o [facility] p [phone]   f [phone]             (spec: Pulmonary, CCM, Sleep) ~1/2016             will see soon, ha flu shot            .              GI: Now Dr. Thai Ya p [phone] at   [address] [place] [address] Dr Smith-1    Fax: [phone] (also his wife's doctor)            .             Hematologist:   Dr. 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 ([facility])             I-131 Rx  Recent US neck at [facility] [place]:  negative Recent thyroglobulin and thyroglobulin ab negative. TSH is suppressed. Has Crohn's and short bowel. Getting TSH just right challenging. Max dose of levothyroxine was 600 mcg daily and he is now down to 400   Impression:  Hypothyroidism well controlled.  TSH may be a tad lower than needed, but because of GI issues, challenging to get just at lower limits of normal.  Plan:  Same Rx.   May 12, 2021    in person  PCP: Dr. Job Leon c/o [facility] p [phone]   f [phone]             (spec: Pulmonary, CCM, Sleep) ~1/2016             will see soon, ha flu shot            .              GI: Now Dr. Thai Ya p [phone] at   [address] [place] [address] Dr Smith-1    Fax: [phone] (also his wife's doctor)            .             Hematologist: Deuce Darling p  fax [phone]->   Dr. Antonio              [address] [facility]      CC: Thyroid cancer              1971 Right lobectomy: benign              1992 Left multifocal papillary thyroid cancer/vascular invasion ([facility])             I-131 Rx   Recent labs at [facility] [place] on May 5 included  WBC   6.87 Hct 41.4  PSA 1.27 B12 437    on monthly injection  TSH 0.02 T4 10.8  on levothyroxine 300 + 200 mcg daily:  Sun 200, Mon 500, T 400, Wed 500, Th 500, F 500 S 500                            will lower by another 100:  Th will be 400 instead of 500 25 OH vit D 37    on 50,000 iu BIW  LDL 46 HDL 40  glucose 82 calcium 9.0 creatinine 1.05 thyroglobulin tumor marker   Imp:  TSH suppressed, will lower dose of thyroxine by another 100 mcg/week. Updated labs before visit in six months and US neck.       Nov 11, 2020       PCP: Dr. Job Leon c/o [facility] p [phone]   f [phone]             (spec: Pulmonary, CCM, Sleep) ~1/2016             will see soon, ha flu shot            .              GI: Now Dr. Thai Ya p [phone] at             [address] [place] [address] Dr Smith-1             Fax: [phone] (also his wife's doctor)            .             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 TSH suppressed. Possibly has a short fuse or just stress of   Taking LT4 300 plus 200 x 6 days and 200 x 1 day. So can decrease the 200 one day to 100 that day.  Examination:  Constitutional:  Alert, well nourished, healthy appearance, no acute distress  Eyes:  No proptosis, no lid lag Thyroid: Pulmonary:  No respiratory distress, no accessory muscle use; normal rate and effort Cardiac:  Normal rate Vascular:  Endocrine:  No stigmata of Cushing's Syndrome Musculoskeletal:  Normal gait, no involuntary movements Neurology:  No tremors Affect/Mood/Psych:  Oriented x 3; normal affect, normal insight/judgement, normal mood  .  Impression:  Still follows with Hematology.     TSH of 0.01 may be lower than necessary even though bone density in 2015 was very good     at [place] with T scroes LS + 2.3,  TH + 0.8       forearm T + 1.7 Thyroglobulin and thyroglobulin antibody remains negative.  Plan:  the one day a week where he takes only 200 mcg, he can decrease to 100 mcg.          Oct 14, 2019   PCP: Dr. Job Leon c/o [facility] p [phone]             f [phone]             (spec: Pulmonary, CCM, Sleep) ~1/2016             will see soon, ha flu shot            .              GI: Now Dr. Thai Ya p [phone] at             [address] [place] [address] Dr Mack1             Fax: [phone] (also his wife's doctor)            .             Hematologist: Deuce Darling p  fax [phone] [address] [facility]  At last visit, TSH 0.08 T3 100, T4 11.3 while taking average daily dose of 457 mcg of levothyroxine (highest he ever required was ~ 600 mcg/d b/o Crohns and shorter bowel).  The 457 was generated by taking 200 mcg x 7 days and 300 mcg x 6 days, so we will consider 200 + 200 + 25 mcg    Plans:   TFTs today and He is concerned about his vegan wife's iodine intake.   Labs today and again before next visit (at [facility] [place]) Ultrasound neck before next visit as well. He is comfortable with suppressed TSH and his bone density is far above average.                  March 22, 2019    PCP: Dr. Job Leon c/o [facility] p [phone]             f [phone]             (spec: Pulmonary, CCM, Sleep) ~1/2016             will see soon, ha flu shot            .              GI: Now Dr. Thai Ya p [phone] at             [address] [place] [address] Dr Mack1             Fax: [phone] (also his wife's doctor)            .             Hematologist: Duece Darling p  fax [phone] [address] [facility]             next ~ 12/2017             next July 2018 (q6 mos)            .  Has new hematologist.  He believes Dr. Vann is at [facility]. Dose of thyroxine now 300 mcg plus 200 mcg  but now skips the 300 once a week.  Recent US neck at [place] not changed.  Plan:  Labs today. ROV.               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              .    Previous notes from [employer] appended below.     April 20, 2018    PCP: Dr. Job Leon [facility] p [phone]             f [phone]             (spec: Pulmonary, CCM, Sleep) ~1/2016             will see soon, ha flu shot            .              GI: Now Dr. Thai Ya p [phone] at             [address] [place] [address] Dr Smith-1             Fax: [phone] (also his wife's doctor)            .             Hematologist: Deuce Darling p  fax [phone] [address] [facility]             next ~ 12/2017             next July 2018 (q6 mos)            .             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              .             ..             .             CC: Thyroid cancer              1971 Right lobectomy: benign              1992 Left multifocal papillary thyroid cancer/vascular invasion ([facility])             I-131 Rx            .            68 yo - feels well, has some fatigue.            Taking levothyroxine religiously.             .            Taking levothyroxine 500 mgc (300 + 200 mcg) daily.            Has diarrhea and is on various treatments.             Takes low dose OTC Immodium            .            On antibiotic as he just had a dental implant.             Most recent 2018 ultrasound of neck/thyroid bed at [facility] [place]:            .            CLINICAL HISTORY: Thyroid cancer              Thyroid Ultrasound              Real-time sonographic examination was performed on 4/4/2018 and compared to a previous study dated             9/28/2016. The patient is status post total thyroidectomy. A 2.5 x 0.5 x 1.9 cm mass with an             echogenic hilum is again noted within the right thyroid bed and likely to represent a lymph node.             It is grossly unchanged since the patient's previous study. No mass or residual thyroid tissue is             present within the left thyroid bed.              IMPRESSION: Status post total thyroidectomy. Stable lymph node within the right thyroid bed.             ***Electronically Signed ***             -----------------------------------------------             Kahlil Banks MD             .            .            I reviewed with Mr. Fitzgerald the reports from 2015 and 2016            .            Impression: Very stable. Lymph node neck being monitored.            Plan: Next U/S Thyroid bed within next year.             TFTs and thyroglobulin today            ROV 6 months.             .            ==            .            October 25, 2017            .            PCP: Dr. Job Leon c/o [facility] p [phone]             f [phone]             (spec: Pulmonary, CCM, Sleep) ~1/2016             will see soon, ha flu shot            .              GI: Now Dr. Thai Ya p [phone] at             [address] [place] [address] Dr Smith-1             Fax: [phone]            .             Hematologist: Deuce Darling p  fax [phone]             [address] [place] [address] - [facility]             next ~ 12/2017            .             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              .             .             CC: Thyroid cancer              1971 Right lobectomy: benign              1992 Left multifocal papillary thyroid cancer/vascular invasion ([facility])             I-131 Rx             .             (Crohn's disease)             Vitamin D deficiency             Low platelets & platelet clumping             Preclinical "CLL" (2015: 3 % atypical lymphocytes)            .            -1992 - [facility] - Left thyroid lobectomy - multifocal papillary thyroid cancer: "Mass in the left lobe of the thyroid - 2 cm firm well circumscribed nodule within a 3.3 cm fleshy maroon nodule. The dominant tumor mass microscopically a papillary carcinoma. Within the tumor itself, vascular invasion identified. Along the perimeter of the main tumor, multiple microscopic satellite nodules of papillary carcinoma, several of the foci closely approached the external thyroid capsule.....in addition to the main tumor, there were at least three separate nodules in the left lobe ranging in size from 0.1 to 1.0 cm and there was tumor invoving the right lobe. At least ten separate foci fo papillary carcinoma up to 0.4 cm...close to a total thyroidectomy ..." as noted in [facility] pathology report.             .            Most recent studies:            6/2015 - Sonogram - [facility] - several benign appearing lymph nodes in both carotid chains that are well circumscribed with echogenic jordy, larges 2.7 cm...(Dr. William Rivera)              6/2015 - Bone density: Spine +2.3, L hip T +0.8, forearm +1.7             L femoral neck T -0.4             .            Currently taking levothyroxine 500: 300 plus 200 mcg daily.             Taking vitamin D 50,000 weekly, but he forgets and probably takes            about 3 pills a month.            .             Still has diarrhea; however, he believes this is related to resection of intestine rather than any flare.             .            Impression: The inflammatory bowel disease required surgery in the past and it is the short bowel that seems to be responsible for his requirement for 500 mcg of levothyroxine a day. TSH has been kept low and bone density is in good range. Serial ultrasound and thyroglobulin levels have been very reassuring. He has also required vitamin D for previously low levels, and has done well on supplementation.             .            Plan: Updated lab tests today. Results will be mailed to  [facility] and faxed to the other members of his healthcare team.             .             Return in about six months and follow up ultrasound of neck before that. DEN RAMIREZ

## 2024-05-28 ENCOUNTER — RX RENEWAL (OUTPATIENT)
Age: 74
End: 2024-05-28

## 2024-05-28 RX ORDER — ERGOCALCIFEROL 1.25 MG/1
1.25 MG CAPSULE, LIQUID FILLED ORAL
Qty: 24 | Refills: 3 | Status: ACTIVE | COMMUNITY
Start: 2019-08-25 | End: 1900-01-01

## 2024-06-06 ENCOUNTER — NON-APPOINTMENT (OUTPATIENT)
Age: 74
End: 2024-06-06

## 2024-06-14 ENCOUNTER — NON-APPOINTMENT (OUTPATIENT)
Age: 74
End: 2024-06-14

## 2024-07-14 ENCOUNTER — NON-APPOINTMENT (OUTPATIENT)
Age: 74
End: 2024-07-14

## 2024-07-15 ENCOUNTER — APPOINTMENT (OUTPATIENT)
Dept: VASCULAR SURGERY | Facility: CLINIC | Age: 74
End: 2024-07-15
Payer: MEDICARE

## 2024-07-15 VITALS
HEART RATE: 65 BPM | DIASTOLIC BLOOD PRESSURE: 71 MMHG | BODY MASS INDEX: 25.31 KG/M2 | SYSTOLIC BLOOD PRESSURE: 127 MMHG | WEIGHT: 191 LBS | HEIGHT: 73 IN

## 2024-07-15 VITALS
WEIGHT: 198 LBS | BODY MASS INDEX: 26.24 KG/M2 | SYSTOLIC BLOOD PRESSURE: 127 MMHG | HEIGHT: 73 IN | DIASTOLIC BLOOD PRESSURE: 71 MMHG | HEART RATE: 65 BPM

## 2024-07-15 DIAGNOSIS — I78.1 NEVUS, NON-NEOPLASTIC: ICD-10-CM

## 2024-07-15 PROCEDURE — 99213 OFFICE O/P EST LOW 20 MIN: CPT

## 2024-07-16 PROBLEM — I78.1 SPIDER VEINS: Status: ACTIVE | Noted: 2024-07-16

## 2024-10-16 ENCOUNTER — RESULT REVIEW (OUTPATIENT)
Age: 74
End: 2024-10-16

## 2024-10-25 ENCOUNTER — APPOINTMENT (OUTPATIENT)
Dept: ENDOCRINOLOGY | Facility: CLINIC | Age: 74
End: 2024-10-25
Payer: MEDICARE

## 2024-10-25 VITALS
HEIGHT: 73 IN | DIASTOLIC BLOOD PRESSURE: 76 MMHG | WEIGHT: 198 LBS | BODY MASS INDEX: 26.24 KG/M2 | SYSTOLIC BLOOD PRESSURE: 118 MMHG | OXYGEN SATURATION: 98 % | HEART RATE: 81 BPM

## 2024-10-25 DIAGNOSIS — R97.20 ELEVATED PROSTATE, SPECIFIC ANTIGEN [PSA]: ICD-10-CM

## 2024-10-25 DIAGNOSIS — E03.9 HYPOTHYROIDISM, UNSPECIFIED: ICD-10-CM

## 2024-10-25 DIAGNOSIS — E11.9 TYPE 2 DIABETES MELLITUS W/OUT COMPLICATIONS: ICD-10-CM

## 2024-10-25 PROCEDURE — 36415 COLL VENOUS BLD VENIPUNCTURE: CPT

## 2024-10-25 PROCEDURE — 99214 OFFICE O/P EST MOD 30 MIN: CPT

## 2024-10-27 PROBLEM — E11.9 DIABETES MELLITUS TYPE 2 IN NONOBESE: Status: RESOLVED | Noted: 2020-11-03 | Resolved: 2024-10-27

## 2024-10-27 LAB
PSA FREE FLD-MCNC: 28 %
PSA FREE SERPL-MCNC: 0.49 NG/ML
PSA SERPL-MCNC: 1.75 NG/ML

## 2025-04-22 ENCOUNTER — RESULT REVIEW (OUTPATIENT)
Age: 75
End: 2025-04-22

## 2025-04-23 ENCOUNTER — RX RENEWAL (OUTPATIENT)
Age: 75
End: 2025-04-23

## 2025-04-25 ENCOUNTER — APPOINTMENT (OUTPATIENT)
Dept: ENDOCRINOLOGY | Facility: CLINIC | Age: 75
End: 2025-04-25
Payer: MEDICARE

## 2025-04-25 VITALS
HEIGHT: 73 IN | SYSTOLIC BLOOD PRESSURE: 128 MMHG | DIASTOLIC BLOOD PRESSURE: 60 MMHG | WEIGHT: 197 LBS | OXYGEN SATURATION: 98 % | TEMPERATURE: 97.6 F | HEART RATE: 72 BPM | BODY MASS INDEX: 26.11 KG/M2 | RESPIRATION RATE: 16 BRPM

## 2025-04-25 DIAGNOSIS — E03.9 HYPOTHYROIDISM, UNSPECIFIED: ICD-10-CM

## 2025-04-25 DIAGNOSIS — C73 MALIGNANT NEOPLASM OF THYROID GLAND: ICD-10-CM

## 2025-04-25 PROCEDURE — 99214 OFFICE O/P EST MOD 30 MIN: CPT

## 2025-05-12 ENCOUNTER — NON-APPOINTMENT (OUTPATIENT)
Age: 75
End: 2025-05-12

## 2025-05-12 ENCOUNTER — APPOINTMENT (OUTPATIENT)
Dept: ENDOCRINOLOGY | Facility: CLINIC | Age: 75
End: 2025-05-12
Payer: MEDICARE

## 2025-05-12 VITALS
OXYGEN SATURATION: 95 % | SYSTOLIC BLOOD PRESSURE: 116 MMHG | HEART RATE: 61 BPM | WEIGHT: 195 LBS | HEIGHT: 73 IN | BODY MASS INDEX: 25.84 KG/M2 | DIASTOLIC BLOOD PRESSURE: 82 MMHG

## 2025-05-12 DIAGNOSIS — E03.9 HYPOTHYROIDISM, UNSPECIFIED: ICD-10-CM

## 2025-05-12 DIAGNOSIS — C73 MALIGNANT NEOPLASM OF THYROID GLAND: ICD-10-CM

## 2025-05-12 PROCEDURE — 99214 OFFICE O/P EST MOD 30 MIN: CPT

## 2025-05-12 RX ORDER — TAMSULOSIN HYDROCHLORIDE 0.4 MG/1
CAPSULE ORAL
Refills: 0 | Status: ACTIVE | COMMUNITY

## 2025-05-15 ENCOUNTER — NON-APPOINTMENT (OUTPATIENT)
Age: 75
End: 2025-05-15

## 2025-06-03 ENCOUNTER — RX RENEWAL (OUTPATIENT)
Age: 75
End: 2025-06-03